# Patient Record
Sex: MALE | Race: WHITE | NOT HISPANIC OR LATINO | Employment: OTHER | URBAN - NONMETROPOLITAN AREA
[De-identification: names, ages, dates, MRNs, and addresses within clinical notes are randomized per-mention and may not be internally consistent; named-entity substitution may affect disease eponyms.]

---

## 2017-01-04 ENCOUNTER — HOSPITAL ENCOUNTER (EMERGENCY)
Facility: HOSPITAL | Age: 67
Discharge: ANOTHER HOSPITAL | End: 2017-01-05
Attending: INTERNAL MEDICINE | Admitting: INTERNAL MEDICINE
Payer: COMMERCIAL

## 2017-01-04 VITALS
OXYGEN SATURATION: 95 % | DIASTOLIC BLOOD PRESSURE: 87 MMHG | BODY MASS INDEX: 36.57 KG/M2 | RESPIRATION RATE: 16 BRPM | SYSTOLIC BLOOD PRESSURE: 166 MMHG | WEIGHT: 254.85 LBS | HEART RATE: 77 BPM | TEMPERATURE: 97.9 F

## 2017-01-04 DIAGNOSIS — G45.8 OTHER SPECIFIED TRANSIENT CEREBRAL ISCHEMIAS: ICD-10-CM

## 2017-01-04 LAB
ALBUMIN SERPL-MCNC: 3.4 G/DL (ref 3.4–5)
ALBUMIN UR-MCNC: NEGATIVE MG/DL
ALP SERPL-CCNC: 160 U/L (ref 40–150)
ALT SERPL W P-5'-P-CCNC: 25 U/L (ref 0–70)
ANION GAP SERPL CALCULATED.3IONS-SCNC: 10 MMOL/L (ref 3–14)
APPEARANCE UR: CLEAR
AST SERPL W P-5'-P-CCNC: 14 U/L (ref 0–45)
BASOPHILS # BLD AUTO: 0 10E9/L (ref 0–0.2)
BASOPHILS NFR BLD AUTO: 0.4 %
BILIRUB SERPL-MCNC: 0.5 MG/DL (ref 0.2–1.3)
BILIRUB UR QL STRIP: NEGATIVE
BUN SERPL-MCNC: 19 MG/DL (ref 7–30)
CALCIUM SERPL-MCNC: 9 MG/DL (ref 8.5–10.1)
CHLORIDE SERPL-SCNC: 109 MMOL/L (ref 94–109)
CK SERPL-CCNC: 159 U/L (ref 30–300)
CO2 SERPL-SCNC: 23 MMOL/L (ref 20–32)
COLOR UR AUTO: ABNORMAL
CREAT SERPL-MCNC: 0.75 MG/DL (ref 0.66–1.25)
CRP SERPL-MCNC: 5.5 MG/L (ref 0–8)
DIFFERENTIAL METHOD BLD: ABNORMAL
EOSINOPHIL # BLD AUTO: 0.2 10E9/L (ref 0–0.7)
EOSINOPHIL NFR BLD AUTO: 2.2 %
ERYTHROCYTE [DISTWIDTH] IN BLOOD BY AUTOMATED COUNT: 13.1 % (ref 10–15)
ETHANOL SERPL-MCNC: <0.01 G/DL
GFR SERPL CREATININE-BSD FRML MDRD: ABNORMAL ML/MIN/1.7M2
GLUCOSE BLDC GLUCOMTR-MCNC: 110 MG/DL (ref 70–99)
GLUCOSE SERPL-MCNC: 128 MG/DL (ref 70–99)
GLUCOSE UR STRIP-MCNC: NEGATIVE MG/DL
HCT VFR BLD AUTO: 37.6 % (ref 40–53)
HGB BLD-MCNC: 13.2 G/DL (ref 13.3–17.7)
HGB UR QL STRIP: NEGATIVE
IMM GRANULOCYTES # BLD: 0 10E9/L (ref 0–0.4)
IMM GRANULOCYTES NFR BLD: 0.3 %
INR PPP: 0.98 (ref 0.8–1.2)
KETONES UR STRIP-MCNC: NEGATIVE MG/DL
LACTATE SERPL-SCNC: 1.1 MMOL/L (ref 0.4–2)
LEUKOCYTE ESTERASE UR QL STRIP: NEGATIVE
LYMPHOCYTES # BLD AUTO: 2.1 10E9/L (ref 0.8–5.3)
LYMPHOCYTES NFR BLD AUTO: 28.8 %
MCH RBC QN AUTO: 30.8 PG (ref 26.5–33)
MCHC RBC AUTO-ENTMCNC: 35.1 G/DL (ref 31.5–36.5)
MCV RBC AUTO: 88 FL (ref 78–100)
MONOCYTES # BLD AUTO: 0.4 10E9/L (ref 0–1.3)
MONOCYTES NFR BLD AUTO: 6.1 %
MUCOUS THREADS #/AREA URNS LPF: PRESENT /LPF
NEUTROPHILS # BLD AUTO: 4.5 10E9/L (ref 1.6–8.3)
NEUTROPHILS NFR BLD AUTO: 62.2 %
NITRATE UR QL: NEGATIVE
NRBC # BLD AUTO: 0 10*3/UL
NRBC BLD AUTO-RTO: 0 /100
PH UR STRIP: 5.5 PH (ref 4.7–8)
PLATELET # BLD AUTO: 140 10E9/L (ref 150–450)
POTASSIUM SERPL-SCNC: 3.7 MMOL/L (ref 3.4–5.3)
PROT SERPL-MCNC: 7.5 G/DL (ref 6.8–8.8)
RBC # BLD AUTO: 4.28 10E12/L (ref 4.4–5.9)
RBC #/AREA URNS AUTO: 0 /HPF (ref 0–2)
SODIUM SERPL-SCNC: 142 MMOL/L (ref 133–144)
SP GR UR STRIP: 1.01 (ref 1–1.03)
URN SPEC COLLECT METH UR: ABNORMAL
UROBILINOGEN UR STRIP-MCNC: NORMAL MG/DL (ref 0–2)
WBC # BLD AUTO: 7.3 10E9/L (ref 4–11)
WBC #/AREA URNS AUTO: <1 /HPF (ref 0–2)

## 2017-01-04 PROCEDURE — 70450 CT HEAD/BRAIN W/O DYE: CPT | Mod: TC

## 2017-01-04 PROCEDURE — 85610 PROTHROMBIN TIME: CPT | Performed by: INTERNAL MEDICINE

## 2017-01-04 PROCEDURE — 93005 ELECTROCARDIOGRAM TRACING: CPT

## 2017-01-04 PROCEDURE — 82550 ASSAY OF CK (CPK): CPT | Performed by: INTERNAL MEDICINE

## 2017-01-04 PROCEDURE — 71010 ZZHC CHEST ONE VIEW: CPT | Mod: TC

## 2017-01-04 PROCEDURE — 81001 URINALYSIS AUTO W/SCOPE: CPT | Mod: 59 | Performed by: INTERNAL MEDICINE

## 2017-01-04 PROCEDURE — 00000146 ZZHCL STATISTIC GLUCOSE BY METER IP

## 2017-01-04 PROCEDURE — 99291 CRITICAL CARE FIRST HOUR: CPT | Mod: 25

## 2017-01-04 PROCEDURE — 36415 COLL VENOUS BLD VENIPUNCTURE: CPT | Performed by: INTERNAL MEDICINE

## 2017-01-04 PROCEDURE — 40000275 ZZH STATISTIC RCP TIME EA 10 MIN

## 2017-01-04 PROCEDURE — 80053 COMPREHEN METABOLIC PANEL: CPT | Performed by: INTERNAL MEDICINE

## 2017-01-04 PROCEDURE — 86140 C-REACTIVE PROTEIN: CPT | Performed by: INTERNAL MEDICINE

## 2017-01-04 PROCEDURE — 93010 ELECTROCARDIOGRAM REPORT: CPT | Performed by: INTERNAL MEDICINE

## 2017-01-04 PROCEDURE — 85025 COMPLETE CBC W/AUTO DIFF WBC: CPT | Performed by: INTERNAL MEDICINE

## 2017-01-04 PROCEDURE — 99285 EMERGENCY DEPT VISIT HI MDM: CPT | Performed by: INTERNAL MEDICINE

## 2017-01-04 PROCEDURE — 83605 ASSAY OF LACTIC ACID: CPT | Performed by: INTERNAL MEDICINE

## 2017-01-04 PROCEDURE — 80320 DRUG SCREEN QUANTALCOHOLS: CPT | Performed by: INTERNAL MEDICINE

## 2017-01-04 RX ORDER — CETIRIZINE HYDROCHLORIDE 10 MG/1
10 TABLET ORAL DAILY PRN
COMMUNITY

## 2017-01-05 ASSESSMENT — ENCOUNTER SYMPTOMS
LIGHT-HEADEDNESS: 0
APPETITE CHANGE: 0
ACTIVITY CHANGE: 0
SHORTNESS OF BREATH: 0
BLOOD IN STOOL: 0
MYALGIAS: 0
NEUROLOGIC COMPLAINT: 1
SLEEP DISTURBANCE: 0
FLANK PAIN: 0
ABDOMINAL PAIN: 0
ANAL BLEEDING: 0
NUMBNESS: 0
FATIGUE: 0
FREQUENCY: 0
WEAKNESS: 0
FEVER: 0
PALPITATIONS: 0
COLOR CHANGE: 0
VOICE CHANGE: 0
WHEEZING: 0
VOMITING: 0
DIZZINESS: 0
CHILLS: 0
COUGH: 0
HEADACHES: 0
UNEXPECTED WEIGHT CHANGE: 0
ABDOMINAL DISTENTION: 0
NAUSEA: 0
DIAPHORESIS: 0
CONFUSION: 0
BACK PAIN: 0
NECK PAIN: 0
CHEST TIGHTNESS: 0
DYSURIA: 0

## 2017-01-05 NOTE — ED NOTES
Called rena in [place] and spoke with charge nurse on 8E, she will have Cheries RN call back for nurse to nurse report.

## 2017-01-05 NOTE — ED NOTES
"Pt to ED via [facility]. About 50 minutes PTA pt had right sided weakness and slurred speech at home per wife. Pt was unable to hold water bottle per wife. When pt arrived to ED pt had slurred speech with right sided numbness and mild weakness. Upon arrival to ED pt states \"I'm starting to feel worse again\" and when asked name pt was hesitant and then was able to answer but speech was slurred. Wife states that speech is altered. Code stroke called.   "

## 2017-01-05 NOTE — ED PROVIDER NOTES
History     Chief Complaint   Patient presents with     Slurred Speech     slurred speech at home, wife feels speech is altered     Patient is a 66 year old male presenting with neurologic complaint. The history is provided by the patient.   Neurologic Problem  This is a new problem. The current episode started less than 1 hour ago. The problem has been resolved. Pertinent negatives include no chest pain, no abdominal pain, no headaches and no shortness of breath. Nothing aggravates the symptoms.     Joon Hernandez Sr is a 66 year old male who came for slurred speech, R sided weakness, facial drop that started 8:45 pm. In ER symptoms already resolved. Pt was able to walk without problem, smiling, no arm drift.   I was called by family member 2 times that slurred speech came back, but each time I entered the room symptoms was resolved and pt speaking normally, no neurologic deficit was noticed.     I have reviewed the Medications, Allergies, Past Medical and Surgical History, and Social History in the Epic system.    Review of Systems   Constitutional: Negative for fever, chills, diaphoresis, activity change, appetite change, fatigue and unexpected weight change.   HENT: Negative for voice change.    Eyes: Negative for visual disturbance.   Respiratory: Negative for cough, chest tightness, shortness of breath and wheezing.    Cardiovascular: Negative for chest pain, palpitations and leg swelling.   Gastrointestinal: Negative for nausea, vomiting, abdominal pain, blood in stool, abdominal distention and anal bleeding.   Genitourinary: Negative for dysuria, frequency, flank pain and decreased urine volume.   Musculoskeletal: Negative for myalgias, back pain, gait problem and neck pain.   Skin: Negative for color change, pallor and rash.   Neurological: Negative for dizziness, syncope, weakness, light-headedness, numbness and headaches.   Psychiatric/Behavioral: Negative for suicidal ideas, confusion and sleep  disturbance.       Physical Exam   Pulse: 77  Temp: 97.9  F (36.6  C)  Resp: 16  SpO2: 98 %  Physical Exam   Constitutional: He is oriented to person, place, and time. He appears well-developed and well-nourished.   HENT:   Head: Normocephalic and atraumatic.   Mouth/Throat: No oropharyngeal exudate.   Eyes: Conjunctivae are normal. Pupils are equal, round, and reactive to light.   Neck: Normal range of motion. Neck supple. No JVD present. No tracheal deviation present. No thyromegaly present.   Cardiovascular: Normal rate, regular rhythm, normal heart sounds and intact distal pulses.  Exam reveals no gallop and no friction rub.    No murmur heard.  Pulmonary/Chest: Effort normal and breath sounds normal. No stridor. No respiratory distress. He has no wheezes. He has no rales. He exhibits no tenderness.   Abdominal: Soft. Bowel sounds are normal. He exhibits no distension and no mass. There is no tenderness. There is no rebound and no guarding.   Musculoskeletal: Normal range of motion. He exhibits no edema or tenderness.   Lymphadenopathy:     He has no cervical adenopathy.   Neurological: He is alert and oriented to person, place, and time. He displays no atrophy and no tremor. No cranial nerve deficit or sensory deficit. He exhibits normal muscle tone. He displays a negative Romberg sign. He displays no seizure activity. Coordination and gait normal. GCS eye subscore is 4. GCS verbal subscore is 5. GCS motor subscore is 6.   Reflex Scores:       Patellar reflexes are 2+ on the right side and 2+ on the left side.       Achilles reflexes are 2+ on the right side and 2+ on the left side.  Skin: Skin is warm and dry. No rash noted. No erythema. No pallor.   Psychiatric: His behavior is normal.   Nursing note and vitals reviewed.      ED Course   Procedures               Labs Ordered and Resulted from Time of ED Arrival Up to the Time of Departure from the ED   ROUTINE UA WITH MICROSCOPIC REFLEX TO CULTURE - Abnormal;  Notable for the following:     Mucous Urine Present (*)     All other components within normal limits   CBC WITH PLATELETS DIFFERENTIAL - Abnormal; Notable for the following:     RBC Count 4.28 (*)     Hemoglobin 13.2 (*)     Hematocrit 37.6 (*)     Platelet Count 140 (*)     All other components within normal limits   COMPREHENSIVE METABOLIC PANEL - Abnormal; Notable for the following:     Glucose 128 (*)     Alkaline Phosphatase 160 (*)     All other components within normal limits   GLUCOSE BY METER - Abnormal; Notable for the following:     Glucose 110 (*)     All other components within normal limits   ALCOHOL ETHYL   CK TOTAL   CRP INFLAMMATION   INR   LACTIC ACID       Assessments & Plan (with Medical Decision Making)   National Institutes of Health Stroke Scale  Exam Interval: Baseline   Score    Level of consciousness: (0)   Alert, keenly responsive    LOC questions: (0)   Answers both questions correctly    LOC commands: (0)   Performs both tasks correctly    Best gaze: (0)   Normal    Visual: (0)   No visual loss    Facial palsy: (0)   Normal symmetrical movements    Motor arm (left): (0)   No drift    Motor arm (right): (0)   No drift    Motor leg (left): (0)   No drift    Motor leg (right): (0)   No drift    Limb ataxia: (0)   Absent    Sensory: (0)   Normal- no sensory loss    Best language: (0)   Normal- no aphasia    Dysarthria: (0)   Normal    Extinction and inattention: (0)   No abnormality        Total Score:  0     R sided weakness+ facial droop+ slurred speech  Symptoms completely resolved  I spoke vic Mendez neurologist, he recommended admission and watch for reccurrence of symptoms, possible capsular instability syndrome vs TIA, he recommend if symptoms repeated he wound be a candidate for TPA  I spoke to Dr Wells, he advised to transfer to a center with stroke specialist as symptoms are atypical and pt benefit from direct observation of symptoms by a stroke specialist   Family agreed  with transfer,   Spoke to Dr Castellon, accepted for admission  Pt was AAox3 , no neurolgic deficit at the time transfer    I have reviewed the nursing notes.    I have reviewed the findings, diagnosis, plan and need for follow up with the patient.    Discharge Medication List as of 1/5/2017 12:58 AM          Final diagnoses:   Other specified transient cerebral ischemias       1/4/2017   [facility]      Alex Saeed MD  01/05/17 0130

## 2017-01-05 NOTE — ED NOTES
Pt continues to be negative for FAST but has some facial numbness and slurred speech that comes on and resolves suddenly.

## 2017-01-05 NOTE — ED NOTES
Dr. Wells in to see pt. States speech is slurred again. Family confirmed that speech is not normal.

## 2017-01-07 ENCOUNTER — TRANSFERRED RECORDS (OUTPATIENT)
Dept: HEALTH INFORMATION MANAGEMENT | Facility: HOSPITAL | Age: 67
End: 2017-01-07

## 2017-01-12 ENCOUNTER — TRANSFERRED RECORDS (OUTPATIENT)
Dept: HEALTH INFORMATION MANAGEMENT | Facility: HOSPITAL | Age: 67
End: 2017-01-12

## 2017-01-13 DIAGNOSIS — E11.9 CONTROLLED TYPE 2 DIABETES MELLITUS WITHOUT COMPLICATION, WITHOUT LONG-TERM CURRENT USE OF INSULIN (H): Primary | ICD-10-CM

## 2017-01-19 ENCOUNTER — OFFICE VISIT (OUTPATIENT)
Dept: SLEEP MEDICINE | Facility: HOSPITAL | Age: 67
End: 2017-01-19
Attending: INTERNAL MEDICINE
Payer: COMMERCIAL

## 2017-01-19 VITALS
WEIGHT: 250 LBS | HEART RATE: 88 BPM | OXYGEN SATURATION: 94 % | SYSTOLIC BLOOD PRESSURE: 122 MMHG | DIASTOLIC BLOOD PRESSURE: 58 MMHG | HEIGHT: 70 IN | BODY MASS INDEX: 35.79 KG/M2

## 2017-01-19 DIAGNOSIS — G47.33 OBSTRUCTIVE SLEEP APNEA: Primary | ICD-10-CM

## 2017-01-19 PROCEDURE — 99212 OFFICE O/P EST SF 10 MIN: CPT | Performed by: INTERNAL MEDICINE

## 2017-01-19 PROCEDURE — 99212 OFFICE O/P EST SF 10 MIN: CPT

## 2017-01-19 NOTE — PROGRESS NOTES
"Pt with study in 2013 that showed an AHI of 13 and PLMs, he tried cpap and his stuffy nose precluded its use. Recent small stroke. Still pretty sleepy.  /58 mmHg  Pulse 88  Ht 5' 9.5\" (1.765 m)  Wt 250 lb (113.399 kg)  BMI 36.40 kg/m2  SpO2 94%       A/ LULY and UARS + PLMs, retry cpap, prn doxylamine.  "

## 2017-01-19 NOTE — PROGRESS NOTES
Roomed patient, verified ID by name and .  Took vitals and brief history.  Reviewed medications, allergies and smoking history.  Updated order for APAP faxed to [facility].

## 2017-01-30 ENCOUNTER — HOSPITAL ENCOUNTER (OUTPATIENT)
Dept: EDUCATION SERVICES | Facility: HOSPITAL | Age: 67
Discharge: HOME | End: 2017-01-30
Attending: FAMILY MEDICINE | Admitting: FAMILY MEDICINE
Payer: COMMERCIAL

## 2017-01-30 VITALS
OXYGEN SATURATION: 94 % | HEART RATE: 89 BPM | BODY MASS INDEX: 35.09 KG/M2 | HEIGHT: 70 IN | SYSTOLIC BLOOD PRESSURE: 118 MMHG | WEIGHT: 245.1 LBS | DIASTOLIC BLOOD PRESSURE: 72 MMHG

## 2017-01-30 DIAGNOSIS — E11.9 TYPE 2 DIABETES MELLITUS WITHOUT COMPLICATION, WITHOUT LONG-TERM CURRENT USE OF INSULIN (H): ICD-10-CM

## 2017-01-30 DIAGNOSIS — Z71.89 ACP (ADVANCE CARE PLANNING): Primary | Chronic | ICD-10-CM

## 2017-01-30 PROCEDURE — G0108 DIAB MANAGE TRN  PER INDIV: HCPCS | Performed by: DIETITIAN, REGISTERED

## 2017-01-30 ASSESSMENT — ANXIETY QUESTIONNAIRES
7. FEELING AFRAID AS IF SOMETHING AWFUL MIGHT HAPPEN: NOT AT ALL
GAD7 TOTAL SCORE: 0
1. FEELING NERVOUS, ANXIOUS, OR ON EDGE: NOT AT ALL
5. BEING SO RESTLESS THAT IT IS HARD TO SIT STILL: NOT AT ALL
3. WORRYING TOO MUCH ABOUT DIFFERENT THINGS: NOT AT ALL
2. NOT BEING ABLE TO STOP OR CONTROL WORRYING: NOT AT ALL
6. BECOMING EASILY ANNOYED OR IRRITABLE: NOT AT ALL

## 2017-01-30 ASSESSMENT — PAIN SCALES - GENERAL: PAINLEVEL: NO PAIN (0)

## 2017-01-30 ASSESSMENT — PATIENT HEALTH QUESTIONNAIRE - PHQ9: 5. POOR APPETITE OR OVEREATING: NOT AT ALL

## 2017-01-30 NOTE — PROGRESS NOTES
"Pt here for Session 1.  He is here with his wife who is supportive.  Pt reports he has been \"borderline\" x 25 years.  Did have a meter at one time but did not use it as he could not afford strips for it.  Pt recently had a CVA but has not residual side effects.      BG readings as follows:   2 hours after breakfast-121, 129  2 hours after [id number]  2 hours after [id number]  Overall average is 124.    Most recent A1c was 6.7% (1-5-17).      Blood pressure 118/72, pulse 89, height 1.765 m (5' 9.5\"), weight 111.177 kg (245 lb 1.6 oz), SpO2 94 %.  Weight down 12# when recently hospitalized for CVA.     Current diabetes medications: Metformin  mg with breakfast.      Readiness to learn: very willing.     Barriers to learning: none.     Pt previously only ate one meal per day and snacked throughout the day.  He has since his CVA started to eat 3 meals/day.  Appetite is large.  Ate many chips but has stopped.  Reduced intake of red meat.  Verbal discussion notes diet high in sodium - we discussed this r/t blood pressure issues/CVA.  Likes - salami, tomato juice, sausage.      Topics covered: diabetes pathophysiology, symptoms, diagnosis, treatments, medication actions, BG self-monitoring, HgbA1c, targets (blood sugar/A1c), sharps disposal, complications and risk factors,  food planning/carbohydrate counting meal plan, portion control, label reading and benefits of physical activity.    Pt actively participate and demonstrated adequate understanding of topics discussed.    Pt has One Touch Verio meter. Supplies are covered.  No issues with testing.      Plan: Follow carbohydrate counting meal plan provided.  Keep food logs.  Try to begin some routine exercise - goal is 30 minutes daily.  Test glucose 1x/day - alternate times.     Follow up: Session 2.     Total time spent with patient: 75 minutes.      Annabella Avila, JENNY, CDE    "

## 2017-01-30 NOTE — IP AVS SNAPSHOT
MRN:[id number]                      After Visit Summary   1/30/2017    Joon Hernandez     MRN: [id number]           Thank you!     Thank you for choosing [facility] for your care. Our goal is always to provide you with excellent care. Hearing back from our patients is one way we can continue to improve our services. Please take a few minutes to complete the written survey that you may receive in the mail after you visit with us. Thank you!        Patient Information     Date Of Birth          1950        About your hospital stay     You were admitted on:  January 30, 2017 You last received care in the:  [facility]    You were discharged on:  January 30, 2017       Who to Call     For medical emergencies, please call 911.  For non-urgent questions about your medical care, please call your primary care provider or clinic, [phone]          Attending Provider     Provider    Susan Osullivan MD       Primary Care Provider Office Phone # Fax #    Susan Osullivan [phone]       [facility] [address] [address]        Your next 10 appointments already scheduled     Apr 13, 2017  9:30 AM   Return Visit with Gerry Goff MD   [facility] ([facility] )    [address] [address] [phone]              Further instructions from your care team       -Follow carbohydrate counting meal plan - 60 grams/meal, 15-30 grams/snack.  -Be as active as you can - exercise goal is 30 minutes most days of the week.    -Test glucose 1x/day- alternate times - fasting, before supper, 2 hours after supper.   -Target levels are fasting and before supper , 2 hours after supper less than 160/180.   -Bring food log, book, folder and meter to follow up sessions.   -Call with any questions - JENNY Arvizu, [phone]    Pending Results     No orders found from 1/29/2017 to 1/31/2017.            Admission  "Information        Provider Department Dept Phone    1/30/2017 Susan Osullivan MD [facility] [phone]      Your Vitals Were     Blood Pressure Pulse Height Weight BMI (Body Mass Index) Pulse Oximetry    118/72 mmHg 89 1.765 m (5' 9.5\") 111.177 kg (245 lb 1.6 oz) 35.69 kg/m2 94%      MyChart Information     [facility] gives you secure access to your electronic health record. If you see a primary care provider, you can also send messages to your care team and make appointments. If you have questions, please call your primary care clinic.  If you do not have a primary care provider, please call [phone] and they will assist you.        Care EveryWhere ID     This is your Care EveryWhere ID. This could be used by other organizations to access your [place] [facility] medical records  [id number]           Review of your medicines      UNREVIEWED medicines. Ask your doctor about these medicines        Dose / Directions    allopurinol 100 MG tablet   Commonly known as:  ZYLOPRIM        Dose:  100 mg   Take 100 mg by mouth daily.   Refills:  0       ASPIR-81 PO        Take  by mouth.   Refills:  0       cetirizine 10 MG tablet   Commonly known as:  zyrTEC        Dose:  10 mg   Take 10 mg by mouth daily   Refills:  0       FISH OIL PO        1000 mg once a day   Refills:  0       GLUCOSAMINE CHONDROITIN JOINT PO        Dose:  1 tablet   Take 1 tablet by mouth   Refills:  0       indomethacin 25 MG capsule   Commonly known as:  INDOCIN        Dose:  25 mg   Take 25 mg by mouth 2 times daily as needed   Refills:  0       LIPITOR PO        Dose:  80 mg   Take 80 mg by mouth daily   Refills:  0       lisinopril 10 MG tablet   Commonly known as:  PRINIVIL/ZESTRIL        Dose:  40 mg   Take 40 mg by mouth daily   Refills:  0       METFORMIN HCL ER PO        500 mg once a day   Refills:  0       MULTIVITAMIN PO        Dose:  1 tablet   Take 1 tablet by mouth daily   Refills:  0       PLAVIX PO        Refills:  0       VITAMIN " D (CHOLECALCIFEROL) PO        Dose:  5000 Units   Take 5,000 Units by mouth daily   Refills:  0                Protect others around you: Learn how to safely use, store and throw away your medicines at www.disposemymeds.org.             Medication List: This is a list of all your medications and when to take them. Check marks below indicate your daily home schedule. Keep this list as a reference.      Medications           Morning Afternoon Evening Bedtime As Needed    allopurinol 100 MG tablet   Commonly known as:  ZYLOPRIM   Take 100 mg by mouth daily.                                ASPIR-81 PO   Take  by mouth.                                cetirizine 10 MG tablet   Commonly known as:  zyrTEC   Take 10 mg by mouth daily                                FISH OIL PO   1000 mg once a day                                GLUCOSAMINE CHONDROITIN JOINT PO   Take 1 tablet by mouth                                indomethacin 25 MG capsule   Commonly known as:  INDOCIN   Take 25 mg by mouth 2 times daily as needed                                LIPITOR PO   Take 80 mg by mouth daily                                lisinopril 10 MG tablet   Commonly known as:  PRINIVIL/ZESTRIL   Take 40 mg by mouth daily                                METFORMIN HCL ER PO   500 mg once a day                                MULTIVITAMIN PO   Take 1 tablet by mouth daily                                PLAVIX PO                                VITAMIN D (CHOLECALCIFEROL) PO   Take 5,000 Units by mouth daily

## 2017-01-30 NOTE — IP AVS SNAPSHOT
[facility]    [address] [place] [address]    Phone:  [phone]    Fax:  [phone]                                       After Visit Summary   1/30/2017    Joon Hernandez Sr    MRN: [id number]           After Visit Summary Signature Page     I have received my discharge instructions, and my questions have been answered. I have discussed any challenges I see with this plan with the nurse or doctor.    ..........................................................................................................................................  Patient/Patient Representative Signature      ..........................................................................................................................................  Patient Representative Print Name and Relationship to Patient    ..................................................               ................................................  Date                                            Time    ..........................................................................................................................................  Reviewed by Signature/Title    ...................................................              ..............................................  Date                                                            Time

## 2017-01-30 NOTE — DISCHARGE INSTRUCTIONS
-Follow carbohydrate counting meal plan - 60 grams/meal, 15-30 grams/snack.  -Be as active as you can - exercise goal is 30 minutes most days of the week.    -Test glucose 1x/day- alternate times - fasting, before supper, 2 hours after supper.   -Target levels are fasting and before supper , 2 hours after supper less than 160/180.   -Bring food log, book, folder and meter to follow up sessions.   -Call with any questions - JENNY Arvizu, -[phone]

## 2017-01-31 ASSESSMENT — PATIENT HEALTH QUESTIONNAIRE - PHQ9: SUM OF ALL RESPONSES TO PHQ QUESTIONS 1-9: 8

## 2017-01-31 ASSESSMENT — ANXIETY QUESTIONNAIRES: GAD7 TOTAL SCORE: 0

## 2017-02-27 ENCOUNTER — HOSPITAL ENCOUNTER (OUTPATIENT)
Dept: EDUCATION SERVICES | Facility: HOSPITAL | Age: 67
Discharge: HOME | End: 2017-02-27
Attending: NURSE PRACTITIONER | Admitting: FAMILY MEDICINE
Payer: COMMERCIAL

## 2017-02-27 VITALS
HEART RATE: 71 BPM | OXYGEN SATURATION: 95 % | BODY MASS INDEX: 34.84 KG/M2 | DIASTOLIC BLOOD PRESSURE: 60 MMHG | SYSTOLIC BLOOD PRESSURE: 122 MMHG | WEIGHT: 243.4 LBS | HEIGHT: 70 IN

## 2017-02-27 DIAGNOSIS — E11.9 TYPE 2 DIABETES MELLITUS WITHOUT COMPLICATION, WITHOUT LONG-TERM CURRENT USE OF INSULIN (H): Primary | ICD-10-CM

## 2017-02-27 PROCEDURE — G0108 DIAB MANAGE TRN  PER INDIV: HCPCS | Performed by: DIETITIAN, REGISTERED

## 2017-02-27 ASSESSMENT — PAIN SCALES - GENERAL: PAINLEVEL: NO PAIN (0)

## 2017-02-27 NOTE — PROGRESS NOTES
"Pt here for session 2.     BG readings as follows:   Fasting-129, 132, 124  Before lunch-123  Before supper-109, 136  After [id number]  Overall average is 123    Blood pressure 122/60, pulse 71, height 1.765 m (5' 9.5\"), weight 110.4 kg (243 lb 6.4 oz), SpO2 95 %.  Weight is down 1.7# from last visit.      Current diabetes medications: Metformin  mg with breakfast    Readiness to learn: willing.  Wife very supportive.     Barriers to learning: none.    Pt did bring some food logs today.  He is doing a good job of eating more frequently throughout the day and limiting carbohydrates.  He has reduced portions of snacks and is making healthier choices.  No regular exercise at this time.  Encouraged.    Pt actively participated and demonstrated adequate understanding of topics discussed.     Topics covered: evaluating BG self-test results & improving self-testing skills, meter maintenance & , causes & treatment for high & low blood sugar, sick day management skills, diabetes success plan - behavior change goals set, carbohydrate counting review, strategies for food selection when eating away from home and alcohol and diabetes.     Pt is doing well with using glucose monitor and had no concerns today.      Goals: not addressed today.      Plan: Continue meal planning efforts.   Test 1x/day - alternate times.  Increase exercise to goal of 30 minutes most days of the week.      Follow up: Session 3.      Total time spent with patient: 45 minutes.      JENNY Arvizu, CDE    "

## 2017-02-27 NOTE — DISCHARGE INSTRUCTIONS
-Keep making efforts to limit carbohydrates in your diet - good job!  -Be as active as you can - exercise goal is 30 minutes most days of the week.   -Test 1x/day - alternate times.    -Target levels are fasting and before supper , 2 hours after supper less than 180.    -Weight today was 243.4#.   -Follow up in 1 month.    -Call with any concerns - JENNY Arvizu, -[phone]

## 2017-02-27 NOTE — NURSING NOTE
"Chief Complaint   Patient presents with     Diabetes     session 2- no current concerns        Initial /60 (BP Location: Right arm, Patient Position: Chair, Cuff Size: Adult Large)  Pulse 71  Ht 1.765 m (5' 9.5\")  Wt 110.4 kg (243 lb 6.4 oz)  SpO2 95%  BMI 35.43 kg/m2 Estimated body mass index is 35.43 kg/(m^2) as calculated from the following:    Height as of this encounter: 1.765 m (5' 9.5\").    Weight as of this encounter: 110.4 kg (243 lb 6.4 oz).  Medication Reconciliation: complete     Kim Gómez LPN      "

## 2017-02-27 NOTE — IP AVS SNAPSHOT
[facility]    [address] [place] [address]    Phone:  [phone]    Fax:  [phone]                                       After Visit Summary   2/27/2017    Joon Hernandez Sr    MRN: [id number]           After Visit Summary Signature Page     I have received my discharge instructions, and my questions have been answered. I have discussed any challenges I see with this plan with the nurse or doctor.    ..........................................................................................................................................  Patient/Patient Representative Signature      ..........................................................................................................................................  Patient Representative Print Name and Relationship to Patient    ..................................................               ................................................  Date                                            Time    ..........................................................................................................................................  Reviewed by Signature/Title    ...................................................              ..............................................  Date                                                            Time

## 2017-02-27 NOTE — IP AVS SNAPSHOT
MRN:[id number]                      After Visit Summary   2/27/2017    Joon Hernandez     MRN: [id number]           Thank you!     Thank you for choosing [facility] for your care. Our goal is always to provide you with excellent care. Hearing back from our patients is one way we can continue to improve our services. Please take a few minutes to complete the written survey that you may receive in the mail after you visit with us. Thank you!        Patient Information     Date Of Birth          1950        About your hospital stay     You were admitted on:  February 27, 2017 You last received care in the:  [facility]    You were discharged on:  February 27, 2017       Who to Call     For medical emergencies, please call 911.  For non-urgent questions about your medical care, please call your primary care provider or clinic, [phone]          Attending Provider     Provider Specialty    Dipika Valle NP --       Primary Care Provider Office Phone # Fax #    Susan Osullivan [phone]       [facility] [address] [address]        Your next 10 appointments already scheduled     Apr 13, 2017  9:30 AM CDT   Return Visit with Gerry Goff MD   [facility] ([facility] )    [address] [address] [phone]              Further instructions from your care team       -Keep making efforts to limit carbohydrates in your diet - good job!  -Be as active as you can - exercise goal is 30 minutes most days of the week.   -Test 1x/day - alternate times.    -Target levels are fasting and before supper , 2 hours after supper less than 180.    -Weight today was 243.4#.   -Follow up in 1 month.    -Call with any concerns - JENNY Arvizu, [phone]    Pending Results     No orders found from 2/25/2017 to 2/28/2017.            Admission Information     Date & Time Provider Department Dept. Phone     "2/27/2017 Dipika Valle NP [facility] [phone]      Your Vitals Were     Blood Pressure Pulse Height Weight Pulse Oximetry BMI (Body Mass Index)    122/60 (BP Location: Right arm, Patient Position: Chair, Cuff Size: Adult Large) 71 1.765 m (5' 9.5\") 110.4 kg (243 lb 6.4 oz) 95% 35.43 kg/m2      [employer] Information     [employer] gives you secure access to your electronic health record. If you see a primary care provider, you can also send messages to your care team and make appointments. If you have questions, please call your primary care clinic.  If you do not have a primary care provider, please call [phone] and they will assist you.        Care EveryWhere ID     This is your Care EveryWhere ID. This could be used by other organizations to access your [facility] medical records  [id number]           Review of your medicines      UNREVIEWED medicines. Ask your doctor about these medicines        Dose / Directions    allopurinol 100 MG tablet   Commonly known as:  ZYLOPRIM        Dose:  100 mg   Take 100 mg by mouth daily.   Refills:  0       ASPIR-81 PO        Take  by mouth.   Refills:  0       cetirizine 10 MG tablet   Commonly known as:  zyrTEC        Dose:  10 mg   Take 10 mg by mouth daily   Refills:  0       FISH OIL PO        1000 mg once a day   Refills:  0       GLUCOSAMINE CHONDROITIN JOINT PO        Dose:  1 tablet   Take 1 tablet by mouth   Refills:  0       indomethacin 25 MG capsule   Commonly known as:  INDOCIN        Dose:  25 mg   Take 25 mg by mouth 2 times daily as needed   Refills:  0       LIPITOR PO        Dose:  80 mg   Take 80 mg by mouth daily   Refills:  0       lisinopril 10 MG tablet   Commonly known as:  PRINIVIL/ZESTRIL        Dose:  40 mg   Take 40 mg by mouth daily   Refills:  0       METFORMIN HCL ER PO        500 mg once a day   Refills:  0       MULTIVITAMIN PO        Dose:  1 tablet   Take 1 tablet by mouth daily   Refills:  0       PLAVIX PO        Refills:  " 0       VITAMIN D (CHOLECALCIFEROL) PO        Dose:  5000 Units   Take 5,000 Units by mouth daily   Refills:  0                Protect others around you: Learn how to safely use, store and throw away your medicines at www.disposemymeds.org.             Medication List: This is a list of all your medications and when to take them. Check marks below indicate your daily home schedule. Keep this list as a reference.      Medications           Morning Afternoon Evening Bedtime As Needed    allopurinol 100 MG tablet   Commonly known as:  ZYLOPRIM   Take 100 mg by mouth daily.                                ASPIR-81 PO   Take  by mouth.                                cetirizine 10 MG tablet   Commonly known as:  zyrTEC   Take 10 mg by mouth daily                                FISH OIL PO   1000 mg once a day                                GLUCOSAMINE CHONDROITIN JOINT PO   Take 1 tablet by mouth                                indomethacin 25 MG capsule   Commonly known as:  INDOCIN   Take 25 mg by mouth 2 times daily as needed                                LIPITOR PO   Take 80 mg by mouth daily                                lisinopril 10 MG tablet   Commonly known as:  PRINIVIL/ZESTRIL   Take 40 mg by mouth daily                                METFORMIN HCL ER PO   500 mg once a day                                MULTIVITAMIN PO   Take 1 tablet by mouth daily                                PLAVIX PO                                VITAMIN D (CHOLECALCIFEROL) PO   Take 5,000 Units by mouth daily

## 2017-03-21 ENCOUNTER — TRANSFERRED RECORDS (OUTPATIENT)
Dept: HEALTH INFORMATION MANAGEMENT | Facility: HOSPITAL | Age: 67
End: 2017-03-21

## 2017-03-27 ENCOUNTER — HOSPITAL ENCOUNTER (OUTPATIENT)
Dept: EDUCATION SERVICES | Facility: HOSPITAL | Age: 67
Discharge: HOME | End: 2017-03-27
Attending: NURSE PRACTITIONER | Admitting: FAMILY MEDICINE
Payer: COMMERCIAL

## 2017-03-27 VITALS
DIASTOLIC BLOOD PRESSURE: 70 MMHG | HEIGHT: 70 IN | BODY MASS INDEX: 34.46 KG/M2 | HEART RATE: 84 BPM | SYSTOLIC BLOOD PRESSURE: 131 MMHG | WEIGHT: 240.7 LBS | OXYGEN SATURATION: 95 %

## 2017-03-27 DIAGNOSIS — E11.9 TYPE 2 DIABETES MELLITUS WITHOUT COMPLICATION, WITHOUT LONG-TERM CURRENT USE OF INSULIN (H): Primary | ICD-10-CM

## 2017-03-27 PROCEDURE — 97803 MED NUTRITION INDIV SUBSEQ: CPT | Performed by: DIETITIAN, REGISTERED

## 2017-03-27 ASSESSMENT — PAIN SCALES - GENERAL: PAINLEVEL: NO PAIN (0)

## 2017-03-27 NOTE — DISCHARGE INSTRUCTIONS
-Keep limiting carbohydrates in your diet.  60 grams/meal, 15-30 grams/snack.   -Be as active as you can - 30 minutes most days of the week is goal.   -Test glucose 1x/day - alternate times.   -Target levels are fasting and before meals , 2 hours after meals less than 180.   -Weight today was 240.7#.    -Follow up in 1 month.    -Call with any questions-concerns - JENNY Arvizu, [phone]

## 2017-03-27 NOTE — NURSING NOTE
"Chief Complaint   Patient presents with     Diabetes     session 3       Initial /70 (BP Location: Left arm, Patient Position: Chair, Cuff Size: Adult Large)  Pulse 84  Ht 1.765 m (5' 9.5\")  Wt 109.2 kg (240 lb 11.2 oz)  SpO2 95%  BMI 35.04 kg/m2 Estimated body mass index is 35.04 kg/(m^2) as calculated from the following:    Height as of this encounter: 1.765 m (5' 9.5\").    Weight as of this encounter: 109.2 kg (240 lb 11.2 oz).  Medication Reconciliation: complete   Sydney Amaya      "

## 2017-03-27 NOTE — IP AVS SNAPSHOT
MRN:[id number]                      After Visit Summary   3/27/2017    Joon Hernandez     MRN: [id number]           Thank you!     Thank you for choosing [facility] for your care. Our goal is always to provide you with excellent care. Hearing back from our patients is one way we can continue to improve our services. Please take a few minutes to complete the written survey that you may receive in the mail after you visit with us. Thank you!        Patient Information     Date Of Birth          1950        About your hospital stay     You were admitted on:  March 27, 2017 You last received care in the:  [facility]    You were discharged on:  March 27, 2017       Who to Call     For medical emergencies, please call 911.  For non-urgent questions about your medical care, please call your primary care provider or clinic, [phone]          Attending Provider     Provider Specialty    Dipika Valle NP --       Primary Care Provider Office Phone # Fax #    Susan Osullivan [phone]       [facility] [address] [address]        Your next 10 appointments already scheduled     Apr 13, 2017  9:30 AM CDT   Return Visit with Gerry Goff MD   [facility] ([facility] )    [address] [address] [phone]              Further instructions from your care team       -Keep limiting carbohydrates in your diet.  60 grams/meal, 15-30 grams/snack.   -Be as active as you can - 30 minutes most days of the week is goal.   -Test glucose 1x/day - alternate times.   -Target levels are fasting and before meals , 2 hours after meals less than 180.   -Weight today was 240.7#.    -Follow up in 1 month.    -Call with any questions-concerns - JENNY Arvizu, [phone]    Pending Results     No orders found from 3/25/2017 to 3/28/2017.            Admission Information     Date & Time Provider Department Dept.  "Phone    3/27/2017 Dipika Valle NP [facility] [phone]      Your Vitals Were     Blood Pressure Pulse Height Weight Pulse Oximetry BMI (Body Mass Index)    131/70 (BP Location: Left arm, Patient Position: Chair, Cuff Size: Adult Large) 84 1.765 m (5' 9.5\") 109.2 kg (240 lb 11.2 oz) 95% 35.04 kg/m2      [employer] Information     [employer] gives you secure access to your electronic health record. If you see a primary care provider, you can also send messages to your care team and make appointments. If you have questions, please call your primary care clinic.  If you do not have a primary care provider, please call [phone] and they will assist you.        Care EveryWhere ID     This is your Care EveryWhere ID. This could be used by other organizations to access your [place] medical records  [id number]           Review of your medicines      UNREVIEWED medicines. Ask your doctor about these medicines        Dose / Directions    allopurinol 100 MG tablet   Commonly known as:  ZYLOPRIM        Dose:  100 mg   Take 100 mg by mouth daily.   Refills:  0       ASPIR-81 PO        Take  by mouth.   Refills:  0       cetirizine 10 MG tablet   Commonly known as:  zyrTEC        Dose:  10 mg   Take 10 mg by mouth daily   Refills:  0       FISH OIL PO        1000 mg once a day   Refills:  0       GLUCOSAMINE CHONDROITIN JOINT PO        Dose:  1 tablet   Take 1 tablet by mouth   Refills:  0       indomethacin 25 MG capsule   Commonly known as:  INDOCIN        Dose:  25 mg   Take 25 mg by mouth 2 times daily as needed   Refills:  0       LIPITOR PO        Dose:  80 mg   Take 80 mg by mouth daily   Refills:  0       lisinopril 10 MG tablet   Commonly known as:  PRINIVIL/ZESTRIL        Dose:  40 mg   Take 40 mg by mouth daily   Refills:  0       METFORMIN HCL ER PO        500 mg once a day   Refills:  0       MULTIVITAMIN PO        Dose:  1 tablet   Take 1 tablet by mouth daily   Refills:  0       PLAVIX PO        " Refills:  0       VITAMIN D (CHOLECALCIFEROL) PO        Dose:  5000 Units   Take 5,000 Units by mouth daily   Refills:  0                Protect others around you: Learn how to safely use, store and throw away your medicines at www.disposemymeds.org.             Medication List: This is a list of all your medications and when to take them. Check marks below indicate your daily home schedule. Keep this list as a reference.      Medications           Morning Afternoon Evening Bedtime As Needed    allopurinol 100 MG tablet   Commonly known as:  ZYLOPRIM   Take 100 mg by mouth daily.                                ASPIR-81 PO   Take  by mouth.                                cetirizine 10 MG tablet   Commonly known as:  zyrTEC   Take 10 mg by mouth daily                                FISH OIL PO   1000 mg once a day                                GLUCOSAMINE CHONDROITIN JOINT PO   Take 1 tablet by mouth                                indomethacin 25 MG capsule   Commonly known as:  INDOCIN   Take 25 mg by mouth 2 times daily as needed                                LIPITOR PO   Take 80 mg by mouth daily                                lisinopril 10 MG tablet   Commonly known as:  PRINIVIL/ZESTRIL   Take 40 mg by mouth daily                                METFORMIN HCL ER PO   500 mg once a day                                MULTIVITAMIN PO   Take 1 tablet by mouth daily                                PLAVIX PO                                VITAMIN D (CHOLECALCIFEROL) PO   Take 5,000 Units by mouth daily

## 2017-03-27 NOTE — IP AVS SNAPSHOT
[facility]    [address] [place] [address]    Phone:  [phone]    Fax:  [phone]                                       After Visit Summary   3/27/2017    Joon Hernandez Sr    MRN: [id number]           After Visit Summary Signature Page     I have received my discharge instructions, and my questions have been answered. I have discussed any challenges I see with this plan with the nurse or doctor.    ..........................................................................................................................................  Patient/Patient Representative Signature      ..........................................................................................................................................  Patient Representative Print Name and Relationship to Patient    ..................................................               ................................................  Date                                            Time    ..........................................................................................................................................  Reviewed by Signature/Title    ...................................................              ..............................................  Date                                                            Time

## 2017-03-27 NOTE — PROGRESS NOTES
"Pt here for session 3.  He is here again with his wife and she is supportive.     BG readings as follows:   [id number]  Before jlcku-  Post [id number]  Overall average is 126.     A1c at dx was 6.7%.  Plan is for recheck 4-10-17.      Blood pressure 131/70, pulse 84, height 1.765 m (5' 9.5\"), weight 109.2 kg (240 lb 11.2 oz), SpO2 95 %.  Weight is down 2.7# from last visit, down 4.4# from initial visit.     Current diabetes medications: Metformin  mg with breakfast.      Readiness to learn: very willing.     Barriers to learning: none.      Pt reports he continues to make efforts to limit carbohydrates in his diet.  He states that recently they have been busy and dining out more which is challenging.  Reviewed hints for healthy choices when dining out.  No routine exercise still.      Foot exam 11/2016.  Eye exam March 2017.  Lipids 1-2017:  Tg-78, HDL-38, LDL-53 with use of Lipitor, Fish Oil supplements.     Pt actively participated and demonstrated adequate understanding of topics discussed.  Topics reviewed include: BG self-test and A1c connection, evaluating records to better understand highs and lows, how diabetes and therapies will change over time, expectations of future primary care visits, prevention of complications, foot care skills, risk factors for heart disease,  importance of choosing unsaturated fats, blood pressure and lipid targets, and heart healthy guidelines.     Plan: Continue meal planning efforts.  Exercise encouraged.  Consider increase Metformin ER to 500 mg bid as some fasting levels are borderline.  Pt encouraged to discuss with provider and upcoming visit.  Test 1x/day - alternate times.      Follow up: Session 4.     Total time spent with patient: 50 minutes.      JENNY Arvizu, CDE      "

## 2017-04-13 ENCOUNTER — OFFICE VISIT (OUTPATIENT)
Dept: SLEEP MEDICINE | Facility: HOSPITAL | Age: 67
End: 2017-04-13
Attending: INTERNAL MEDICINE
Payer: COMMERCIAL

## 2017-04-13 VITALS
DIASTOLIC BLOOD PRESSURE: 62 MMHG | OXYGEN SATURATION: 99 % | RESPIRATION RATE: 12 BRPM | SYSTOLIC BLOOD PRESSURE: 112 MMHG | HEART RATE: 74 BPM

## 2017-04-13 DIAGNOSIS — G47.30 SLEEP APNEA, UNSPECIFIED TYPE: Primary | ICD-10-CM

## 2017-04-13 PROCEDURE — 99212 OFFICE O/P EST SF 10 MIN: CPT

## 2017-04-13 PROCEDURE — 99211 OFF/OP EST MAY X REQ PHY/QHP: CPT | Performed by: INTERNAL MEDICINE

## 2017-04-13 NOTE — PROGRESS NOTES
Doing well, likes the nasal pillows best. Compliance is 100 %, AHI  Less than 1, fells more awake during the day.   /62  Pulse 74  Resp 12  SpO2 99%    A/ LULY well treated.

## 2017-04-13 NOTE — MR AVS SNAPSHOT
After Visit Summary   4/13/2017    Joon Hernandez     MRN: [id number]           Patient Information     Date Of Birth          1950        Visit Information        Provider Department      4/13/2017 9:30 AM Gerry Goff MD [facility]        Today's Diagnoses     Sleep apnea, unspecified type    -  1       Follow-ups after your visit        Your next 10 appointments already scheduled     Apr 24, 2017 10:30 AM CDT   (Arrive by 10:15 AM)   Return Visit with Annabella Avila RD   [facility] ([facility] )    [address] [place] [address] [phone]              Who to contact     If you have questions or need follow up information about today's clinic visit or your schedule please contact [facility] directly at [phone].  Normal or non-critical lab and imaging results will be communicated to you by MyChart, letter or phone within 4 business days after the clinic has received the results. If you do not hear from us within 7 days, please contact the clinic through [employer] or phone. If you have a critical or abnormal lab result, we will notify you by phone as soon as possible.  Submit refill requests through [employer] or call your pharmacy and they will forward the refill request to us. Please allow 3 business days for your refill to be completed.          Additional Information About Your Visit        MyChart Information     [employer] gives you secure access to your electronic health record. If you see a primary care provider, you can also send messages to your care team and make appointments. If you have questions, please call your primary care clinic.  If you do not have a primary care provider, please call [phone] and they will assist you.        Care EveryWhere ID     This is your Care EveryWhere ID. This could be used by other organizations to access your [facility] medical records  [id number]        Your Vitals Were     Pulse Respirations  Pulse Oximetry             74 12 99%          Blood Pressure from Last 3 Encounters:   04/13/17 112/62   03/27/17 131/70   02/27/17 122/60    Weight from Last 3 Encounters:   03/27/17 240 lb 11.2 oz (109.2 kg)   02/27/17 243 lb 6.4 oz (110.4 kg)   01/30/17 245 lb 1.6 oz (111.2 kg)              Today, you had the following     No orders found for display       Primary Care Provider Office Phone # Fax #    Susan Osullivan [phone]       [address] [address]        Thank you!     Thank you for choosing [facility]  for your care. Our goal is always to provide you with excellent care. Hearing back from our patients is one way we can continue to improve our services. Please take a few minutes to complete the written survey that you may receive in the mail after your visit with us. Thank you!             Your Updated Medication List - Protect others around you: Learn how to safely use, store and throw away your medicines at www.disposemymeds.org.          This list is accurate as of: 4/13/17  9:43 AM.  Always use your most recent med list.                   Brand Name Dispense Instructions for use    allopurinol 100 MG tablet    ZYLOPRIM     Take 100 mg by mouth daily.       ASPIR-81 PO      Take  by mouth.       cetirizine 10 MG tablet    zyrTEC     Take 10 mg by mouth daily       FISH OIL PO      1000 mg once a day       GLUCOSAMINE CHONDROITIN JOINT PO      Take 1 tablet by mouth       indomethacin 25 MG capsule    INDOCIN     Take 25 mg by mouth 2 times daily as needed       LIPITOR PO      Take 80 mg by mouth daily       lisinopril 10 MG tablet    PRINIVIL/ZESTRIL     Take 40 mg by mouth daily       METFORMIN HCL ER PO      500 mg once a day       MULTIVITAMIN PO      Take 1 tablet by mouth daily       PLAVIX PO          VITAMIN D (CHOLECALCIFEROL) PO      Take 5,000 Units by mouth daily

## 2017-04-24 ENCOUNTER — HOSPITAL ENCOUNTER (OUTPATIENT)
Dept: EDUCATION SERVICES | Facility: HOSPITAL | Age: 67
Discharge: HOME | End: 2017-04-24
Attending: NURSE PRACTITIONER | Admitting: FAMILY MEDICINE
Payer: COMMERCIAL

## 2017-04-24 VITALS
WEIGHT: 240.4 LBS | DIASTOLIC BLOOD PRESSURE: 67 MMHG | HEART RATE: 82 BPM | SYSTOLIC BLOOD PRESSURE: 125 MMHG | OXYGEN SATURATION: 94 % | BODY MASS INDEX: 34.41 KG/M2 | HEIGHT: 70 IN

## 2017-04-24 DIAGNOSIS — E11.9 TYPE 2 DIABETES MELLITUS WITHOUT COMPLICATION, WITHOUT LONG-TERM CURRENT USE OF INSULIN (H): Primary | ICD-10-CM

## 2017-04-24 PROCEDURE — G0108 DIAB MANAGE TRN  PER INDIV: HCPCS | Performed by: DIETITIAN, REGISTERED

## 2017-04-24 ASSESSMENT — PAIN SCALES - GENERAL: PAINLEVEL: NO PAIN (0)

## 2017-04-24 NOTE — NURSING NOTE
"Chief Complaint   Patient presents with     Diabetes     session 4       Initial /67 (BP Location: Right arm, Patient Position: Chair, Cuff Size: Adult Large)  Pulse 82  Ht 1.765 m (5' 9.5\")  Wt 109 kg (240 lb 6.4 oz)  SpO2 94%  BMI 34.99 kg/m2 Estimated body mass index is 34.99 kg/(m^2) as calculated from the following:    Height as of this encounter: 1.765 m (5' 9.5\").    Weight as of this encounter: 109 kg (240 lb 6.4 oz).  Medication Reconciliation: complete   Sydney Amaya      "

## 2017-04-24 NOTE — IP AVS SNAPSHOT
[facility]    [address] [place] [address]    Phone:  [phone]    Fax:  [phone]                                       After Visit Summary   4/24/2017    Joon Hernandez Sr    MRN: [id number]           After Visit Summary Signature Page     I have received my discharge instructions, and my questions have been answered. I have discussed any challenges I see with this plan with the nurse or doctor.    ..........................................................................................................................................  Patient/Patient Representative Signature      ..........................................................................................................................................  Patient Representative Print Name and Relationship to Patient    ..................................................               ................................................  Date                                            Time    ..........................................................................................................................................  Reviewed by Signature/Title    ...................................................              ..............................................  Date                                                            Time

## 2017-04-24 NOTE — DISCHARGE INSTRUCTIONS
-Keep limiting the carbohydrates in your diet.    -Be as active as you can - exercise goal is 30 minutes most days of the week.    -Test 1x/day - alternate times.   -Target levels are fasting and before meals , 2 hours after meals less than 180.    -A1c was 6.2% which is down from 6.7% in January.   -Weight today was 240#.     -Follow up annually and as needed.    -Call with any questions - JENNY Arvizu, -[phone]

## 2017-04-24 NOTE — IP AVS SNAPSHOT
MRN:[id number]                      After Visit Summary   4/24/2017    Joon Hernandez     MRN: [id number]           Thank you!     Thank you for choosing [facility] for your care. Our goal is always to provide you with excellent care. Hearing back from our patients is one way we can continue to improve our services. Please take a few minutes to complete the written survey that you may receive in the mail after you visit with us. Thank you!        Patient Information     Date Of Birth          1950        About your hospital stay     You were admitted on:  April 24, 2017 You last received care in the:  [facility]    You were discharged on:  April 24, 2017       Who to Call     For medical emergencies, please call 911.  For non-urgent questions about your medical care, please call your primary care provider or clinic, [phone]          Attending Provider     Provider Specialty    Dipika Valle NP --       Primary Care Provider Office Phone # Fax #    Susan Osullivan [phone]       [address] [address]        Further instructions from your care team       -Keep limiting the carbohydrates in your diet.    -Be as active as you can - exercise goal is 30 minutes most days of the week.    -Test 1x/day - alternate times.   -Target levels are fasting and before meals , 2 hours after meals less than 180.    -A1c was 6.2% which is down from 6.7% in January.   -Weight today was 240#.     -Follow up annually and as needed.    -Call with any questions - JENNY Arvizu, E [phone]      Pending Results     No orders found from 4/22/2017 to 4/25/2017.            Admission Information     Date & Time Provider Department Dept. Phone    4/24/2017 Dipika Valle NP [facility] [phone]      Your Vitals Were     Blood Pressure Pulse Height Weight Pulse Oximetry BMI (Body Mass Index)    125/67 (BP Location:  "Right arm, Patient Position: Chair, Cuff Size: Adult Large) 82 1.765 m (5' 9.5\") 109 kg (240 lb 6.4 oz) 94% 34.99 kg/m2      [employer] Information     [employer] gives you secure access to your electronic health record. If you see a primary care provider, you can also send messages to your care team and make appointments. If you have questions, please call your primary care clinic.  If you do not have a primary care provider, please call [phone] and they will assist you.        Care EveryWhere ID     This is your Care EveryWhere ID. This could be used by other organizations to access your [facility] medical records  [id number]           Review of your medicines      UNREVIEWED medicines. Ask your doctor about these medicines        Dose / Directions    allopurinol 100 MG tablet   Commonly known as:  ZYLOPRIM        Dose:  100 mg   Take 100 mg by mouth daily.   Refills:  0       ASPIR-81 PO        Take  by mouth.   Refills:  0       cetirizine 10 MG tablet   Commonly known as:  zyrTEC        Dose:  10 mg   Take 10 mg by mouth daily   Refills:  0       FISH OIL PO        1000 mg once a day   Refills:  0       GLUCOSAMINE CHONDROITIN JOINT PO        Dose:  1 tablet   Take 1 tablet by mouth   Refills:  0       indomethacin 25 MG capsule   Commonly known as:  INDOCIN        Dose:  25 mg   Take 25 mg by mouth 2 times daily as needed   Refills:  0       LIPITOR PO        Dose:  80 mg   Take 80 mg by mouth daily   Refills:  0       lisinopril 10 MG tablet   Commonly known as:  PRINIVIL/ZESTRIL        Dose:  40 mg   Take 40 mg by mouth daily   Refills:  0       METFORMIN HCL ER PO        500 mg once a day   Refills:  0       MULTIVITAMIN PO        Dose:  1 tablet   Take 1 tablet by mouth daily   Refills:  0       VITAMIN D (CHOLECALCIFEROL) PO        Dose:  5000 Units   Take 5,000 Units by mouth daily   Refills:  0                Protect others around you: Learn how to safely use, store and throw away your medicines at " www.disposemymeds.org.             Medication List: This is a list of all your medications and when to take them. Check marks below indicate your daily home schedule. Keep this list as a reference.      Medications           Morning Afternoon Evening Bedtime As Needed    allopurinol 100 MG tablet   Commonly known as:  ZYLOPRIM   Take 100 mg by mouth daily.                                ASPIR-81 PO   Take  by mouth.                                cetirizine 10 MG tablet   Commonly known as:  zyrTEC   Take 10 mg by mouth daily                                FISH OIL PO   1000 mg once a day                                GLUCOSAMINE CHONDROITIN JOINT PO   Take 1 tablet by mouth                                indomethacin 25 MG capsule   Commonly known as:  INDOCIN   Take 25 mg by mouth 2 times daily as needed                                LIPITOR PO   Take 80 mg by mouth daily                                lisinopril 10 MG tablet   Commonly known as:  PRINIVIL/ZESTRIL   Take 40 mg by mouth daily                                METFORMIN HCL ER PO   500 mg once a day                                MULTIVITAMIN PO   Take 1 tablet by mouth daily                                VITAMIN D (CHOLECALCIFEROL) PO   Take 5,000 Units by mouth daily

## 2017-04-24 NOTE — PROGRESS NOTES
"Pt here for Session 4.     BG readings as follows:   Fasting-133, 123, 130, 130  Before meals-115, 195, 119, 190, 112  Post meals-119, 146, 123, 212  Pt states that high levels were when he ate sweets or else tested earlier than 2 hours.  Overall average is 140.    Blood pressure 125/67, pulse 82, height 1.765 m (5' 9.5\"), weight 109 kg (240 lb 6.4 oz), SpO2 94 %.  Weight is down about 4# from initial session.      Current diabetes medications: Metformin  mg with breakfast.     Readiness to learn: willing.  Wife very supportive.     Barriers to learning: none.      Pt actively participated in the session and continues to demonstrate motivation.     Topics covered: diabetes success plan, behavior change goal review, meal planning and importance of long term compliance, developing problem solving skills, stress and how it affects blood sugar, relationship between diabetes and depression along with symptoms of depression and how they affect diabetes self-care. Rationale for consistent self-care and regular diabetes care visits, identifying medical tests/exams needed for regular diabetes care and community resources for ongoing education and support.     Pts A1c 6.2% (4-10-17) which is down from 6.7% (1-5-17).      PHQ-9 completed with score of 8.    Eye exam 3/2017.  Foot exam 11/2016.  Pt does not smoke.      Follow up Diabetes self-management support plan: Continue to limit carbohydrates in diet.  Increase activity to 30 minutes most days of the week.  Test 1x/day at alternating times.  Pt will follow with provider q 3 months for A1c checks.     Will follow annually and prn.      Total visit time: 30 minutes.     JENNY Arvizu, CDE    "

## 2017-06-13 ENCOUNTER — HOSPITAL ENCOUNTER (EMERGENCY)
Facility: HOSPITAL | Age: 67
Discharge: HOME | End: 2017-06-13
Attending: FAMILY MEDICINE | Admitting: FAMILY MEDICINE
Payer: COMMERCIAL

## 2017-06-13 VITALS
DIASTOLIC BLOOD PRESSURE: 76 MMHG | SYSTOLIC BLOOD PRESSURE: 129 MMHG | RESPIRATION RATE: 18 BRPM | OXYGEN SATURATION: 94 % | TEMPERATURE: 97.5 F | HEART RATE: 79 BPM

## 2017-06-13 DIAGNOSIS — R04.0 EPISTAXIS: ICD-10-CM

## 2017-06-13 PROCEDURE — 99283 EMERGENCY DEPT VISIT LOW MDM: CPT | Performed by: FAMILY MEDICINE

## 2017-06-13 PROCEDURE — 99282 EMERGENCY DEPT VISIT SF MDM: CPT

## 2017-06-13 RX ORDER — LISINOPRIL 30 MG/1
30 TABLET ORAL
COMMUNITY
Start: 2017-01-12

## 2017-06-13 RX ORDER — GLUCOSAMINE HCL/CHONDROITIN SU 500-400 MG
1 CAPSULE ORAL
COMMUNITY
Start: 2017-01-20

## 2017-06-13 ASSESSMENT — ENCOUNTER SYMPTOMS
GASTROINTESTINAL NEGATIVE: 1
MUSCULOSKELETAL NEGATIVE: 1
HEMATOLOGIC/LYMPHATIC NEGATIVE: 1
PSYCHIATRIC NEGATIVE: 1
CARDIOVASCULAR NEGATIVE: 1
RESPIRATORY NEGATIVE: 1
ENDOCRINE NEGATIVE: 1
ALLERGIC/IMMUNOLOGIC NEGATIVE: 1
CONSTITUTIONAL NEGATIVE: 1
EYES NEGATIVE: 1
NEUROLOGICAL NEGATIVE: 1

## 2017-06-13 NOTE — ED NOTES
"Pt to room 1 ambulatory with wife accompanying. Pt notes that he developed a nose bleed around 0300 this AM. Pt's wife notes that this is the \"third one in 7 days\". Upon arrival to room bleeding has stopped and pt denies any drainage in back of throat. Bleed was from the left nare initially and states that the left side \"backed up\" and blood came out the right side. Pt denies pain or discomfort and no known injury to face.   "

## 2017-06-13 NOTE — ED PROVIDER NOTES
History     Chief Complaint   Patient presents with     Epistaxis     started around 0300, third one this week     HPI Comments: Woke up with nose bleed at 0300 today. Bleeding stopped upon arrival to ED. Pt states he had a nose bleed earlier this week. Denies prior h/o nose bleeds. No nasal trauma, nose blowing or bad cold symptoms or allergies.  Pt does use CPAP device and states last episode was at night while using CPAP. Pt takes a baby aspirin daily.    The history is provided by the patient. No  was used.     Joon Hernandez Sr is a 67 year old male who presents with above concerns.    I have reviewed the Medications, Allergies, Past Medical and Surgical History, and Social History in the Epic system.    Allergies:   Allergies   Allergen Reactions     Simvastatin Cramps         Current Facility-Administered Medications on File Prior to Encounter:  lactated ringers infusion     Current Outpatient Prescriptions on File Prior to Encounter:  VITAMIN D, CHOLECALCIFEROL, PO Take 5,000 Units by mouth daily   Omega-3 Fatty Acids (FISH OIL PO) 1000 mg once a day   METFORMIN HCL ER  mg once a day   Glucos-Chondroit-Hyaluron-MSM (GLUCOSAMINE CHONDROITIN JOINT PO) Take 1 tablet by mouth 2 times daily    Multiple Vitamins-Minerals (MULTIVITAMIN PO) Take 1 tablet by mouth daily   Atorvastatin Calcium (LIPITOR PO) Take 80 mg by mouth daily    cetirizine (ZYRTEC) 10 MG tablet Take 10 mg by mouth daily as needed    allopurinol (ZYLOPRIM) 100 MG tablet Take 100 mg by mouth daily.   Aspirin (ASPIR-81 PO) Take  by mouth.   indomethacin (INDOCIN) 25 MG capsule Take 25 mg by mouth 2 times daily as needed        Patient Active Problem List   Diagnosis     ACP (advance care planning)       Past Surgical History:   Procedure Laterality Date     ADENOIDECTOMY       COLONOSCOPY  6/9/2014    Procedure: COLONOSCOPY;  Surgeon: Pilar Rivera MD;  Location: [facility]     tonsillectomy         Social History  "  Substance Use Topics     Smoking status: Former Smoker     Packs/day: 0.50     Years: 15.00     Types: Cigars     Quit date: 4/2/1985     Smokeless tobacco: Never Used     Alcohol use No      Comment: \"rarely\"       Most Recent Immunizations   Administered Date(s) Administered     DT (PEDS <7y) 12/13/1959     Influenza (IIV3) 10/01/2013     OPV 05/20/1958     Pneumococcal 23 valent 03/10/2011     TD (ADULT, 7+) 12/08/1977     Tdap (Adacel,Boostrix) 03/10/2011     Zoster vaccine, live 04/01/2014       BMI: Estimated body mass index is 34.99 kg/(m^2) as calculated from the following:    Height as of 4/24/17: 1.765 m (5' 9.5\").    Weight as of 4/24/17: 109 kg (240 lb 6.4 oz).      Review of Systems   Constitutional: Negative.    HENT: Positive for nosebleeds.    Eyes: Negative.    Respiratory: Negative.    Cardiovascular: Negative.    Gastrointestinal: Negative.    Endocrine: Negative.    Genitourinary: Negative.    Musculoskeletal: Negative.    Skin: Negative.    Allergic/Immunologic: Negative.    Neurological: Negative.    Hematological: Negative.    Psychiatric/Behavioral: Negative.        Physical Exam   BP: 152/99  Pulse: 79  Heart Rate: 72  Temp: 97.5  F (36.4  C)  Resp: 18  SpO2: 95 %  Physical Exam   Constitutional: He is oriented to person, place, and time. He appears well-developed and well-nourished. No distress.   HENT:   Head: Normocephalic and atraumatic.   Mouth/Throat: Oropharynx is clear and moist.   Dried blood left nares, no active bleeding.  Nasal septum mucosa erythematous.   Eyes: Conjunctivae and EOM are normal. Pupils are equal, round, and reactive to light.   Neck: Normal range of motion.   Pulmonary/Chest: Effort normal.   Neurological: He is alert and oriented to person, place, and time.   Skin: Skin is warm and dry. He is not diaphoretic.   Psychiatric: He has a normal mood and affect. His behavior is normal.       ED Course     ED Course   Pt observed in ED x 1 hour without recurrence of " nosebleed.  Procedures            Critical Care time:               Labs Ordered and Resulted from Time of ED Arrival Up to the Time of Departure from the ED - No data to display    Assessments & Plan (with Medical Decision Making)     I have reviewed the nursing notes.    I have reviewed the findings, diagnosis, plan and need for follow up with the patient.  Reviewed with pt and wife proper way to apply pressure to nose if bleeding should recur. Advise humidifier or vaporizer, saline nose spray. Pt will add humidity to CPAP.  F/u with ENT this week.  Return to ER if needed. Avoid nose blowing or manipulation of nose for 24 hours.      New Prescriptions    No medications on file       Final diagnoses:   Epistaxis       6/13/2017   [facility]     Ada Vieyra,   06/13/17 [id number]

## 2017-06-13 NOTE — DISCHARGE INSTRUCTIONS
Nosebleed (Adult)    Bleeding from the nose most commonly occurs due to injury or drying and cracking of the inner lining of the nose. Most nosebleeds are due to dry air or nose-picking. They can occur during a common cold or an allergy attack. They can also occur on a very hot day, or from dry air in the winter.  If the bleeding site is found, it may be cauterized (treated to cause a blood clot to form). This may be done with a chemical, heat, or electricity. If the bleeding continues after the site is cauterized, or if the site cannot be found, packing may be placed in your nose. This is to apply pressure and stop the bleeding. The packing may be made of gauze or sponge. A small balloon catheter is sometimes used. These must be removed by your doctor. Some types of packing dissolve on their own.  Home care    If packing was put in your nose, unless told otherwise, do not pull on it or try to remove it yourself. You will be given an appointment to have it removed. You may also have been given antibiotics to prevent a sinus infection. If so, finish all of the medicine.    Do not blow your nose for 12 hours after the bleeding stops. This will allow a strong blood clot to form. Do not pick your nose. This may restart bleeding.    Avoid drinking alcohol and hot liquids for the next two days. Alcohol or hot liquids in your mouth can dilate blood vessels in your nose. This can cause bleeding to start again.    Do not take ibuprofen, naproxen, or aspirin-containing medicines. These thin the blood and may promote nose bleeding. You may take acetaminophen for pain, unless another pain medicine was prescribed.    If the bleeding starts again, sit up and lean forward to prevent swallowing blood. Pinch your nose tightly on both sides, as depicted above, for 10 to 15 minutes.  Time yourself, and don t release the pressure on your nose until 10 minutes is up. If bleeding is not controlled, continue to pinch your nose and call  your health care provider or return to this facility.    If you have a cold, allergies, or dry nasal membranes, lubricate the nasal passages. Apply a small amount of petroleum jelly inside the nose with a cotton swab twice a day (morning and night).    Avoid overheating your home, which can dry the air and worsen your condition.    A humidifier in the room where you sleep will add moisture to the air.    Use a saline nasal spray to keep nasal passages moist.    Do not pick your nose. Keep fingernails trimmed to decrease risk of bleeds.  Follow-up care  Follow up with your health care provider, or as advised. Nasal packing should be rechecked or removed within 2 to 3 days.  When to seek medical advice  Call your health care provider right away if any of these occur.    Another nosebleed that you cannot control    Dizziness, weakness or fainting    You become tired or confused    Fever of 100.4 F (38 C) or higher, or as directed by your health care provider    Headache    Sinus or facial pain    Shortness of breath or trouble breathing    1530-6472 [employer]. [address] [place] [address], [place] [address]. All rights reserved. This information is not intended as a substitute for professional medical care. Always follow your healthcare professional's instructions.

## 2017-06-13 NOTE — ED AVS SNAPSHOT
[facility]    [address] [address]    Phone:  [phone]                                       Joon Hernandez    MRN: [id number]    Department:  [facility]   Date of Visit:  6/13/2017           After Visit Summary Signature Page     I have received my discharge instructions, and my questions have been answered. I have discussed any challenges I see with this plan with the nurse or doctor.    ..........................................................................................................................................  Patient/Patient Representative Signature      ..........................................................................................................................................  Patient Representative Print Name and Relationship to Patient    ..................................................               ................................................  Date                                            Time    ..........................................................................................................................................  Reviewed by Signature/Title    ...................................................              ..............................................  Date                                                            Time

## 2017-06-13 NOTE — ED AVS SNAPSHOT
[facility]    [address]    [place] [address]    Phone:  [phone]                                       Joon Hernandez Sr   MRN: [id number]    Department:  [facility]   Date of Visit:  6/13/2017           Patient Information     Date Of Birth          1950        Your diagnoses for this visit were:     Epistaxis        You were seen by Ada Vieyra DO.      Follow-up Information     Follow up with Veronica Montiel MD. Call today.    Specialty:  Otolaryngology    Why:  For evaluation of recurrent nosebleeds    Contact information:    [facility]  [address]  [place] [address]  [phone]          Follow up with ENT. Schedule an appointment as soon as possible for a visit in 1 day.        Discharge Instructions         Nosebleed (Adult)    Bleeding from the nose most commonly occurs due to injury or drying and cracking of the inner lining of the nose. Most nosebleeds are due to dry air or nose-picking. They can occur during a common cold or an allergy attack. They can also occur on a very hot day, or from dry air in the winter.  If the bleeding site is found, it may be cauterized (treated to cause a blood clot to form). This may be done with a chemical, heat, or electricity. If the bleeding continues after the site is cauterized, or if the site cannot be found, packing may be placed in your nose. This is to apply pressure and stop the bleeding. The packing may be made of gauze or sponge. A small balloon catheter is sometimes used. These must be removed by your doctor. Some types of packing dissolve on their own.  Home care    If packing was put in your nose, unless told otherwise, do not pull on it or try to remove it yourself. You will be given an appointment to have it removed. You may also have been given antibiotics to prevent a sinus infection. If so, finish all of the medicine.    Do not blow your nose for 12 hours after the  bleeding stops. This will allow a strong blood clot to form. Do not pick your nose. This may restart bleeding.    Avoid drinking alcohol and hot liquids for the next two days. Alcohol or hot liquids in your mouth can dilate blood vessels in your nose. This can cause bleeding to start again.    Do not take ibuprofen, naproxen, or aspirin-containing medicines. These thin the blood and may promote nose bleeding. You may take acetaminophen for pain, unless another pain medicine was prescribed.    If the bleeding starts again, sit up and lean forward to prevent swallowing blood. Pinch your nose tightly on both sides, as depicted above, for 10 to 15 minutes.  Time yourself, and don t release the pressure on your nose until 10 minutes is up. If bleeding is not controlled, continue to pinch your nose and call your health care provider or return to this facility.    If you have a cold, allergies, or dry nasal membranes, lubricate the nasal passages. Apply a small amount of petroleum jelly inside the nose with a cotton swab twice a day (morning and night).    Avoid overheating your home, which can dry the air and worsen your condition.    A humidifier in the room where you sleep will add moisture to the air.    Use a saline nasal spray to keep nasal passages moist.    Do not pick your nose. Keep fingernails trimmed to decrease risk of bleeds.  Follow-up care  Follow up with your health care provider, or as advised. Nasal packing should be rechecked or removed within 2 to 3 days.  When to seek medical advice  Call your health care provider right away if any of these occur.    Another nosebleed that you cannot control    Dizziness, weakness or fainting    You become tired or confused    Fever of 100.4 F (38 C) or higher, or as directed by your health care provider    Headache    Sinus or facial pain    Shortness of breath or trouble breathing    6933-8652 [employer]. [address] [place] [address], [place] [address]. All  rights reserved. This information is not intended as a substitute for professional medical care. Always follow your healthcare professional's instructions.          Future Appointments        Provider Department Dept Phone Center    4/24/2018 11:30 AM Annabella Avila RD [facility] [phone] [facility]         Review of your medicines      Our records show that you are taking the medicines listed below. If these are incorrect, please call your family doctor or clinic.        Dose / Directions Last dose taken    allopurinol 100 MG tablet   Commonly known as:  ZYLOPRIM   Dose:  100 mg        Take 100 mg by mouth daily.   Refills:  0        ASPIR-81 PO        Take  by mouth.   Refills:  0        BLOOD GLUCOSE TEST STRIPS Strp   Dose:  1 each        1 each   Refills:  0        cetirizine 10 MG tablet   Commonly known as:  zyrTEC   Dose:  10 mg        Take 10 mg by mouth daily as needed   Refills:  0        FISH OIL PO        1000 mg once a day   Refills:  0        GLUCOSAMINE CHONDROITIN JOINT PO   Dose:  1 tablet        Take 1 tablet by mouth 2 times daily   Refills:  0        indomethacin 25 MG capsule   Commonly known as:  INDOCIN   Dose:  25 mg        Take 25 mg by mouth 2 times daily as needed   Refills:  0        Lancets 30G Misc   Dose:  1 each        1 each   Refills:  0        LIPITOR PO   Dose:  80 mg        Take 80 mg by mouth daily   Refills:  0        lisinopril 30 MG tablet   Commonly known as:  PRINIVIL,ZESTRIL   Dose:  30 mg        Take 30 mg by mouth   Refills:  0        METFORMIN HCL ER PO        500 mg once a day   Refills:  0        MULTIVITAMIN PO   Dose:  1 tablet        Take 1 tablet by mouth daily   Refills:  0        VITAMIN D (CHOLECALCIFEROL) PO   Dose:  5000 Units        Take 5,000 Units by mouth daily   Refills:  0                Orders Needing Specimen Collection     None      Pending Results     No orders found from 6/11/2017 to 6/14/2017.            Pending Culture Results      No orders found from 6/11/2017 to 6/14/2017.            Thank you for choosing [facility]       Thank you for choosing [facility] for your care. Our goal is always to provide you with excellent care. Hearing back from our patients is one way we can continue to improve our services. Please take a few minutes to complete the written survey that you may receive in the mail after you visit with us. Thank you!        [employer] Information     [employer] gives you secure access to your electronic health record. If you see a primary care provider, you can also send messages to your care team and make appointments. If you have questions, please call your primary care clinic.  If you do not have a primary care provider, please call [phone] and they will assist you.        Care EveryWhere ID     This is your Care EveryWhere ID. This could be used by other organizations to access your [facility] medical records  [id number]        After Visit Summary       This is your record. Keep this with you and show to your community pharmacist(s) and doctor(s) at your next visit.

## 2017-06-15 ENCOUNTER — OFFICE VISIT (OUTPATIENT)
Dept: SLEEP MEDICINE | Facility: HOSPITAL | Age: 67
End: 2017-06-15
Attending: INTERNAL MEDICINE
Payer: COMMERCIAL

## 2017-06-15 VITALS
HEART RATE: 70 BPM | RESPIRATION RATE: 12 BRPM | OXYGEN SATURATION: 92 % | SYSTOLIC BLOOD PRESSURE: 118 MMHG | DIASTOLIC BLOOD PRESSURE: 60 MMHG

## 2017-06-15 DIAGNOSIS — G47.33 OBSTRUCTIVE SLEEP APNEA SYNDROME: Primary | ICD-10-CM

## 2017-06-15 PROCEDURE — 99211 OFF/OP EST MAY X REQ PHY/QHP: CPT

## 2017-06-15 PROCEDURE — 99212 OFFICE O/P EST SF 10 MIN: CPT | Performed by: INTERNAL MEDICINE

## 2017-06-15 NOTE — MR AVS SNAPSHOT
After Visit Summary   6/15/2017    Joon Hernandez     MRN: [id number]           Patient Information     Date Of Birth          1950        Visit Information        Provider Department      6/15/2017 11:00 AM Gerry Goff MD [facility]        Today's Diagnoses     Obstructive sleep apnea syndrome    -  1       Follow-ups after your visit        Your next 10 appointments already scheduled     Jun 22, 2017 10:00 AM CDT   (Arrive by 9:45 AM)   New Visit with Emily Seay PA-C   [facility] ([facility] )    [address] [place] [id number] [phone]            Apr 24, 2018 11:30 AM CDT   (Arrive by 11:15 AM)   Return Visit with Annabella Avila RD   [facility] ([facility] )    [address] [place] [address] [phone]              Who to contact     If you have questions or need follow up information about today's clinic visit or your schedule please contact [facility] directly at [phone].  Normal or non-critical lab and imaging results will be communicated to you by [employer]ier[employer], letter or phone within 4 business days after the clinic has received the results. If you do not hear from us within 7 days, please contact the clinic through [employer] or phone. If you have a critical or abnormal lab result, we will notify you by phone as soon as possible.  Submit refill requests through [employer] or call your pharmacy and they will forward the refill request to us. Please allow 3 business days for your refill to be completed.          Additional Information About Your Visit        Haierhart Information     [employer] gives you secure access to your electronic health record. If you see a primary care provider, you can also send messages to your care team and make appointments. If you have questions, please call your primary care clinic.  If you do not have a primary care provider, please call [phone] and they will assist  you.        Care EveryWhere ID     This is your Care EveryWhere ID. This could be used by other organizations to access your [facility] medical records  [id number]        Your Vitals Were     Pulse Respirations Pulse Oximetry             70 12 92%          Blood Pressure from Last 3 Encounters:   06/15/17 118/60   06/13/17 129/76   04/24/17 125/67    Weight from Last 3 Encounters:   04/24/17 240 lb 6.4 oz (109 kg)   03/27/17 240 lb 11.2 oz (109.2 kg)   02/27/17 243 lb 6.4 oz (110.4 kg)              Today, you had the following     No orders found for display       Primary Care Provider Office Phone # Fax #    Susan Osullivan [phone]       [address] [address]        Thank you!     Thank you for choosing [facility]  for your care. Our goal is always to provide you with excellent care. Hearing back from our patients is one way we can continue to improve our services. Please take a few minutes to complete the written survey that you may receive in the mail after your visit with us. Thank you!             Your Updated Medication List - Protect others around you: Learn how to safely use, store and throw away your medicines at www.disposemymeds.org.          This list is accurate as of: 6/15/17 11:12 AM.  Always use your most recent med list.                   Brand Name Dispense Instructions for use    allopurinol 100 MG tablet    ZYLOPRIM     Take 100 mg by mouth daily.       ASPIR-81 PO      Take  by mouth.       BLOOD GLUCOSE TEST STRIPS Strp      1 each       cetirizine 10 MG tablet    zyrTEC     Take 10 mg by mouth daily as needed       FISH OIL PO      1000 mg once a day       GLUCOSAMINE CHONDROITIN JOINT PO      Take 1 tablet by mouth 2 times daily       indomethacin 25 MG capsule    INDOCIN     Take 25 mg by mouth 2 times daily as needed       Lancets 30G Misc      1 each       LIPITOR PO      Take 80 mg by mouth daily       lisinopril 30 MG tablet     PRINIVIL,ZESTRIL     Take 30 mg by mouth       METFORMIN HCL ER PO      500 mg once a day       MULTIVITAMIN PO      Take 1 tablet by mouth daily       VITAMIN D (CHOLECALCIFEROL) PO      Take 5,000 Units by mouth daily

## 2017-06-15 NOTE — PROGRESS NOTES
Doing well with cpap, compliance over 90%, minimal leaks, AHI less than 1, also feel much more rested wearing it. Some recent nosebleeds, seeing an ENT soon.   /60  Pulse 70  Resp 12  SpO2 92%    A/ LULY well treated.

## 2017-06-16 NOTE — NURSING NOTE
Patient ID checked with name and date of birth. Reviewed allergies and home medications. Took Vitals and brief history.   Printed out the compliance download.

## 2017-06-18 ENCOUNTER — HOSPITAL ENCOUNTER (EMERGENCY)
Facility: HOSPITAL | Age: 67
Discharge: HOME | End: 2017-06-18
Payer: COMMERCIAL

## 2017-06-18 VITALS — OXYGEN SATURATION: 96 % | SYSTOLIC BLOOD PRESSURE: 156 MMHG | DIASTOLIC BLOOD PRESSURE: 96 MMHG

## 2017-06-18 DIAGNOSIS — R04.0 EPISTAXIS: ICD-10-CM

## 2017-06-18 PROBLEM — G47.30 SLEEP APNEA: Status: ACTIVE | Noted: 2017-01-12

## 2017-06-18 PROBLEM — I63.9 ISCHEMIC STROKE (H): Status: ACTIVE | Noted: 2017-01-05

## 2017-06-18 LAB
BASOPHILS # BLD AUTO: 0 10E9/L (ref 0–0.2)
BASOPHILS NFR BLD AUTO: 0.4 %
DIFFERENTIAL METHOD BLD: ABNORMAL
EOSINOPHIL # BLD AUTO: 0.2 10E9/L (ref 0–0.7)
EOSINOPHIL NFR BLD AUTO: 2.6 %
ERYTHROCYTE [DISTWIDTH] IN BLOOD BY AUTOMATED COUNT: 12.8 % (ref 10–15)
HCT VFR BLD AUTO: 37.7 % (ref 40–53)
HGB BLD-MCNC: 13.1 G/DL (ref 13.3–17.7)
IMM GRANULOCYTES # BLD: 0 10E9/L (ref 0–0.4)
IMM GRANULOCYTES NFR BLD: 0.3 %
INR PPP: 1.03 (ref 0.8–1.2)
LYMPHOCYTES # BLD AUTO: 2.4 10E9/L (ref 0.8–5.3)
LYMPHOCYTES NFR BLD AUTO: 34.3 %
MCH RBC QN AUTO: 30.7 PG (ref 26.5–33)
MCHC RBC AUTO-ENTMCNC: 34.7 G/DL (ref 31.5–36.5)
MCV RBC AUTO: 88 FL (ref 78–100)
MONOCYTES # BLD AUTO: 0.5 10E9/L (ref 0–1.3)
MONOCYTES NFR BLD AUTO: 7 %
NEUTROPHILS # BLD AUTO: 3.9 10E9/L (ref 1.6–8.3)
NEUTROPHILS NFR BLD AUTO: 55.4 %
NRBC # BLD AUTO: 0 10*3/UL
NRBC BLD AUTO-RTO: 0 /100
PLATELET # BLD AUTO: 149 10E9/L (ref 150–450)
RBC # BLD AUTO: 4.27 10E12/L (ref 4.4–5.9)
WBC # BLD AUTO: 7 10E9/L (ref 4–11)

## 2017-06-18 PROCEDURE — 36415 COLL VENOUS BLD VENIPUNCTURE: CPT

## 2017-06-18 PROCEDURE — 30901 CONTROL OF NOSEBLEED: CPT | Mod: LT

## 2017-06-18 PROCEDURE — 25000132 ZZH RX MED GY IP 250 OP 250 PS 637

## 2017-06-18 PROCEDURE — 27210182 ZZH KIT NASAL PACKING

## 2017-06-18 PROCEDURE — 85610 PROTHROMBIN TIME: CPT

## 2017-06-18 PROCEDURE — 99283 EMERGENCY DEPT VISIT LOW MDM: CPT

## 2017-06-18 PROCEDURE — 85025 COMPLETE CBC W/AUTO DIFF WBC: CPT

## 2017-06-18 RX ORDER — OXYMETAZOLINE HYDROCHLORIDE 0.05 G/100ML
2 SPRAY NASAL ONCE
Status: COMPLETED | OUTPATIENT
Start: 2017-06-18 | End: 2017-06-18

## 2017-06-18 RX ADMIN — OXYMETAZOLINE HYDROCHLORIDE 2 SPRAY: 5 SPRAY NASAL at 19:30

## 2017-06-18 ASSESSMENT — ENCOUNTER SYMPTOMS
SHORTNESS OF BREATH: 0
DIFFICULTY URINATING: 0
ABDOMINAL PAIN: 0
NECK STIFFNESS: 0
CONFUSION: 0
FEVER: 0
COLOR CHANGE: 0
HEADACHES: 0
EYE REDNESS: 0
NERVOUS/ANXIOUS: 1
ARTHRALGIAS: 0

## 2017-06-18 NOTE — ED AVS SNAPSHOT
[facility]    [address] [address]    Phone:  [phone]                                       Joon Hernandez Sr   MRN: [id number]    Department:  [facility]   Date of Visit:  6/18/2017           Patient Information     Date Of Birth          1950        Your diagnoses for this visit were:     Epistaxis        You were seen by Susan Dobbins APRN FNP.      Follow-up Information     Follow up with Susan Osullivan MD In 2 days.    Specialty:  Family Practice    Why:  recheck    Contact information:    [facility]  [address] [address] [phone]          Follow up with [facility].    Specialty:  EMERGENCY MEDICINE    Why:  As needed, If symptoms worsen    Contact information:    [address] [address] [phone]    Additional information:    From [facility]: Take [address]. Turn left at [address]. Turn left at the first stoplight on [address]. At the first stop sign, take a right onto [address]. Take a left into the parking lot and continue through until you reach the North enterance of the building.       From [place]: Take [address]. Take the [address] ramp towards [place]. Turn left onto [address]. Take a slight right onto [address]. Turn left at the first stoplight on [address]. At the first stop sign, take a right onto [address]. Take a left into the parking lot and continue through until you reach the North enterance of the building.       From [place]: Take [address]. Take a right at [address]. At the first stop sign, take a right onto [address]. Take a left into the parking lot and continue through until you reach the North enterance of the building.         Discharge Instructions           See attached for home care  Tylenol for pain  Keep packing clean and intact until f/u  ENT  or PCP f/u in 2 days for recheck and packing removal  Return to ED with worsening sx.     Nosebleed (Adult)    Bleeding from the nose most commonly occurs because of injury or drying and cracking of the inner lining of the nose. Most nosebleeds are because of dry air or nose-picking. They can occur during a common cold or an allergy attack. They can also occur on a very hot day, or from dry air in the winter.  If the bleeding site is found, it may be cauterized. This means it is treated to cause a blood clot to form. This may be done with a chemical, heat, or electricity. If the bleeding continues after the site is cauterized, or if the site cannot be found, packing may be put in your nose. This is to apply pressure and stop the bleeding. The packing may be made of gauze or sponge. A small balloon catheter is sometimes used. These must be removed by your doctor. Some types of packing dissolve on their own.  Home care    If packing was put in your nose, unless told otherwise, do not pull on it or try to remove it yourself. You will be given an appointment to have it removed. You may also have been given antibiotics to prevent a sinus infection. If so, finish all of the medicine.    Do not blow your nose for 12 hours after the bleeding stops. This will allow a strong blood clot to form. Do not pick your nose. This may restart bleeding.    Avoid drinking alcohol and hot liquids for the next 2 days. Alcohol or hot liquids in your mouth can dilate blood vessels in your nose. This can cause bleeding to start again.    Do not take ibuprofen, naproxen, or medicines that contain aspirin. These thin the blood and may cause your nose to bleed. You may take acetaminophen for pain, unless another pain medicine was prescribed.    If the bleeding starts again, sit up and lean forward to prevent swallowing blood. Pinch your nose tightly on both sides, as shown above, for 10 to 15 minutes. Time yourself. Don t release the pressure  on your nose until 10 minutes is up. If bleeding does not stop, continue to pinch your nose and call your healthcare provider or return to this facility.    If you have a cold, allergies, or dry nasal membranes, lubricate the nasal passages. Apply a small amount of petroleum jelly inside the nose with a cotton swab twice a day (morning and night).    Avoid overheating your home. This can dry the air and make your condition worse.    Put a humidifier in the room where you sleep. This will add moisture to the air.    Use a saline nasal spray to keep nasal passages moist.    Do not pick your nose. Keep fingernails trimmed to decrease risk of bleeds.    Do not smoke.  Follow-up care  Follow up with your healthcare provider, or as advised. Nasal packing should be rechecked or removed within 2 to 3 days.  When to seek medical advice  Call your healthcare provider right away if any of these occur.    You have another nosebleed that you cannot control    Dizziness, weakness, or fainting    You become tired or confused    Fever of 100.4 F (38 C) or higher, or as directed by your healthcare provider    Headache    Sinus or facial pain    Shortness of breath or trouble breathing  Date Last Reviewed: 3/22/2015    4190-1527 [employer]. [address] [place] [address]. All rights reserved. This information is not intended as a substitute for professional medical care. Always follow your healthcare professional's instructions.          Future Appointments        Provider Department Dept Phone Center    6/22/2017 10:00 AM Emily Seay PA-C [facility] [phone] [facility]    4/24/2018 11:30 AM Annabella Avila RD [facility] [phone] [facility]         Review of your medicines      Our records show that you are taking the medicines listed below. If these are incorrect, please call your family doctor or clinic.        Dose / Directions Last dose taken    allopurinol 100 MG tablet    Commonly known as:  ZYLOPRIM   Dose:  100 mg        Take 100 mg by mouth daily.   Refills:  0        ASPIR-81 PO        Take  by mouth.   Refills:  0        BLOOD GLUCOSE TEST STRIPS Strp   Dose:  1 each        1 each   Refills:  0        cetirizine 10 MG tablet   Commonly known as:  zyrTEC   Dose:  10 mg        Take 10 mg by mouth daily as needed   Refills:  0        FISH OIL PO        1000 mg once a day   Refills:  0        GLUCOSAMINE CHONDROITIN JOINT PO   Dose:  1 tablet        Take 1 tablet by mouth 2 times daily   Refills:  0        indomethacin 25 MG capsule   Commonly known as:  INDOCIN   Dose:  25 mg        Take 25 mg by mouth 2 times daily as needed   Refills:  0        Lancets 30G Misc   Dose:  1 each        1 each   Refills:  0        LIPITOR PO   Dose:  80 mg        Take 80 mg by mouth daily   Refills:  0        lisinopril 30 MG tablet   Commonly known as:  PRINIVIL,ZESTRIL   Dose:  30 mg        Take 30 mg by mouth   Refills:  0        METFORMIN HCL ER PO        500 mg once a day   Refills:  0        MULTIVITAMIN PO   Dose:  1 tablet        Take 1 tablet by mouth daily   Refills:  0        VITAMIN D (CHOLECALCIFEROL) PO   Dose:  5000 Units        Take 5,000 Units by mouth daily   Refills:  0                Procedures and tests performed during your visit     CBC with platelets differential    INR      Orders Needing Specimen Collection     None      Pending Results     No orders found from 6/16/2017 to 6/19/2017.            Pending Culture Results     No orders found from 6/16/2017 to 6/19/2017.            Thank you for choosing [facility]       Thank you for choosing [facility] for your care. Our goal is always to provide you with excellent care. Hearing back from our patients is one way we can continue to improve our services. Please take a few minutes to complete the written survey that you may receive in the mail after you visit with us. Thank you!        [employer] Information     [employer] gives you  secure access to your electronic health record. If you see a primary care provider, you can also send messages to your care team and make appointments. If you have questions, please call your primary care clinic.  If you do not have a primary care provider, please call [phone] and they will assist you.        Care EveryWhere ID     This is your Care EveryWhere ID. This could be used by other organizations to access your [facility] medical records  [id number]        After Visit Summary       This is your record. Keep this with you and show to your community pharmacist(s) and doctor(s) at your next visit.

## 2017-06-18 NOTE — ED PROVIDER NOTES
History     Chief Complaint   Patient presents with     Epistaxis     started about 20 min ago     The history is provided by the patient. No  was used.     Joon Hernandez Sr is a 67 year old male who presents to ED via private car for evaluation of epistaxis. Onset of sx 20 minutes PTA. 3rd visit, recurrence in last week, 13th bleed in past month. States he was leaning over to put leash on dog when he developed bleed left nare. Pressure applied.  Has been using ky jelly prior to bedtime, added saline to bipap and has an appointment with ENT on 4 days. Denies fever, no cough or cold sx.  Has been  Taking b/p and states it has remained wnl.     I have reviewed the Medications, Allergies, Past Medical and Surgical History, and Social History in the Epic system.    Review of Systems   Constitutional: Negative for fever.   HENT: Positive for nosebleeds. Negative for congestion.    Eyes: Negative for redness.   Respiratory: Negative for shortness of breath.    Cardiovascular: Negative for chest pain.   Gastrointestinal: Negative for abdominal pain.   Genitourinary: Negative for difficulty urinating.   Musculoskeletal: Negative for arthralgias and neck stiffness.   Skin: Negative for color change.   Neurological: Negative for headaches.   Psychiatric/Behavioral: Negative for confusion. The patient is nervous/anxious.        Physical Exam   BP: 142/88  Heart Rate: 82  SpO2: 98 %  Physical Exam   Constitutional: He is oriented to person, place, and time. No distress.   HENT:   Nose: Mucosal edema present. Epistaxis is observed.   Active bleed left nostril   Eyes: Conjunctivae are normal.   Neck: Normal range of motion. Neck supple.   Cardiovascular: Normal rate and regular rhythm.    Pulmonary/Chest: Effort normal. No respiratory distress.   Abdominal: Soft. There is no tenderness.   Neurological: He is alert and oriented to person, place, and time.   Skin: He is not diaphoretic.   Nursing note and  vitals reviewed.      ED Course   Epistaxis tx  Date/Time: 6/18/2017 7:25 PM  Performed by: SUSAN DOBBINS  Authorized by: SUSAN DOBBINS   Consent given by: patient  Patient understanding: patient states understanding of the procedure being performed  Patient identity confirmed: verbally with patient  Treatment site: left Kiesselbach's area  Repair method: silver nitrate and anterior pack  Post-procedure assessment: bleeding stopped  Treatment complexity: simple  Patient tolerance: Patient tolerated the procedure well with no immediate complications              Labs Ordered and Resulted from Time of ED Arrival Up to the Time of Departure from the ED   CBC WITH PLATELETS DIFFERENTIAL - Abnormal; Notable for the following:        Result Value    RBC Count 4.27 (*)     Hemoglobin 13.1 (*)     Hematocrit 37.7 (*)     Platelet Count 149 (*)     All other components within normal limits   INR     Assessments & Plan (with Medical Decision Making)   Pt presents with recurrent epistaxis. VSS, exam as above. Epistaxis treatment per procedure note, failed silver nitrate,  4.5 cm rhino rocket inserted left nostril with complete resolution of bleed. Epic discharge instructions given. Pt discharged in stable condition.     I have reviewed the nursing notes.    I have reviewed the findings, diagnosis, plan and need for follow up with the patient.    New Prescriptions    No medications on file       Final diagnoses:   Epistaxis     See attached for home care  Tylenol for pain  Keep packing clean and intact until f/u  ENT or PCP f/u in 2 days for recheck and packing removal  Return to ED with worsening sx.     6/18/2017   [facility]     Susan Dobbins, SKIP FNP  06/18/17 1932

## 2017-06-18 NOTE — ED AVS SNAPSHOT
[facility]    [address] [address]    Phone:  [phone]                                       Joon Hernandez    MRN: [id number]    Department:  [facility]   Date of Visit:  6/18/2017           After Visit Summary Signature Page     I have received my discharge instructions, and my questions have been answered. I have discussed any challenges I see with this plan with the nurse or doctor.    ..........................................................................................................................................  Patient/Patient Representative Signature      ..........................................................................................................................................  Patient Representative Print Name and Relationship to Patient    ..................................................               ................................................  Date                                            Time    ..........................................................................................................................................  Reviewed by Signature/Title    ...................................................              ..............................................  Date                                                            Time

## 2017-06-18 NOTE — DISCHARGE INSTRUCTIONS
See attached for home care  Tylenol for pain  Keep packing clean and intact until f/u  ENT or PCP f/u in 2 days for recheck and packing removal  Return to ED with worsening sx.     Nosebleed (Adult)    Bleeding from the nose most commonly occurs because of injury or drying and cracking of the inner lining of the nose. Most nosebleeds are because of dry air or nose-picking. They can occur during a common cold or an allergy attack. They can also occur on a very hot day, or from dry air in the winter.  If the bleeding site is found, it may be cauterized. This means it is treated to cause a blood clot to form. This may be done with a chemical, heat, or electricity. If the bleeding continues after the site is cauterized, or if the site cannot be found, packing may be put in your nose. This is to apply pressure and stop the bleeding. The packing may be made of gauze or sponge. A small balloon catheter is sometimes used. These must be removed by your doctor. Some types of packing dissolve on their own.  Home care    If packing was put in your nose, unless told otherwise, do not pull on it or try to remove it yourself. You will be given an appointment to have it removed. You may also have been given antibiotics to prevent a sinus infection. If so, finish all of the medicine.    Do not blow your nose for 12 hours after the bleeding stops. This will allow a strong blood clot to form. Do not pick your nose. This may restart bleeding.    Avoid drinking alcohol and hot liquids for the next 2 days. Alcohol or hot liquids in your mouth can dilate blood vessels in your nose. This can cause bleeding to start again.    Do not take ibuprofen, naproxen, or medicines that contain aspirin. These thin the blood and may cause your nose to bleed. You may take acetaminophen for pain, unless another pain medicine was prescribed.    If the bleeding starts again, sit up and lean forward to prevent swallowing blood. Pinch your nose tightly  on both sides, as shown above, for 10 to 15 minutes. Time yourself. Don t release the pressure on your nose until 10 minutes is up. If bleeding does not stop, continue to pinch your nose and call your healthcare provider or return to this facility.    If you have a cold, allergies, or dry nasal membranes, lubricate the nasal passages. Apply a small amount of petroleum jelly inside the nose with a cotton swab twice a day (morning and night).    Avoid overheating your home. This can dry the air and make your condition worse.    Put a humidifier in the room where you sleep. This will add moisture to the air.    Use a saline nasal spray to keep nasal passages moist.    Do not pick your nose. Keep fingernails trimmed to decrease risk of bleeds.    Do not smoke.  Follow-up care  Follow up with your healthcare provider, or as advised. Nasal packing should be rechecked or removed within 2 to 3 days.  When to seek medical advice  Call your healthcare provider right away if any of these occur.    You have another nosebleed that you cannot control    Dizziness, weakness, or fainting    You become tired or confused    Fever of 100.4 F (38 C) or higher, or as directed by your healthcare provider    Headache    Sinus or facial pain    Shortness of breath or trouble breathing  Date Last Reviewed: 3/22/2015    1908-7875 [employer]. [address] [place] [address], [place] [address]. All rights reserved. This information is not intended as a substitute for professional medical care. Always follow your healthcare professional's instructions.

## 2017-06-19 ENCOUNTER — HOSPITAL ENCOUNTER (EMERGENCY)
Facility: HOSPITAL | Age: 67
Discharge: HOME | End: 2017-06-19
Admitting: EMERGENCY MEDICINE
Payer: COMMERCIAL

## 2017-06-19 VITALS
OXYGEN SATURATION: 96 % | SYSTOLIC BLOOD PRESSURE: 122 MMHG | RESPIRATION RATE: 18 BRPM | TEMPERATURE: 97.7 F | DIASTOLIC BLOOD PRESSURE: 78 MMHG | HEART RATE: 104 BPM

## 2017-06-19 DIAGNOSIS — R04.0 LEFT-SIDED NOSEBLEED: ICD-10-CM

## 2017-06-19 PROCEDURE — 99283 EMERGENCY DEPT VISIT LOW MDM: CPT | Performed by: EMERGENCY MEDICINE

## 2017-06-19 PROCEDURE — 99282 EMERGENCY DEPT VISIT SF MDM: CPT

## 2017-06-19 RX ORDER — HYDROCODONE BITARTRATE AND ACETAMINOPHEN 5; 325 MG/1; MG/1
1 TABLET ORAL EVERY 6 HOURS PRN
COMMUNITY
Start: 2017-06-18 | End: 2017-06-30

## 2017-06-19 ASSESSMENT — ENCOUNTER SYMPTOMS: RHINORRHEA: 1

## 2017-06-19 NOTE — ED AVS SNAPSHOT
[facility]    [address] [address]    Phone:  [phone]                                       Joon Hernandez    MRN: [id number]    Department[facility]  [facility]   Date of Visit:  6/19/2017           After Visit Summary Signature Page     I have received my discharge instructions, and my questions have been answered. I have discussed any challenges I see with this plan with the nurse or doctor.    ..........................................................................................................................................  Patient/Patient Representative Signature      ..........................................................................................................................................  Patient Representative Print Name and Relationship to Patient    ..................................................               ................................................  Date                                            Time    ..........................................................................................................................................  Reviewed by Signature/Title    ...................................................              ..............................................  Date                                                            Time

## 2017-06-19 NOTE — ED AVS SNAPSHOT
[facility]    [address] [address]    Phone:  [phone]                                       Joon Hernandez    MRN: [id number]    Department:  [facility]   Date of Visit:  6/19/2017           Patient Information     Date Of Birth          1950        Your diagnoses for this visit were:     Left-sided nosebleed       Follow-up Information     Follow up with ENT specialist In 1 day.    Why:  as arranged        Follow up with [facility].    Specialty:  EMERGENCY MEDICINE    Why:  As needed, If symptoms worsen    Contact information:    [address] [id number] [phone]    Additional information:    From [place]: Take [address]. Turn left at [address]. Turn left at the first stoplight on [address]. At the first stop sign, take a right onto [address]. Take a left into the parking lot and continue through until you reach the North enterance of the building.       From [place]: Take [address]. Take the [address] ramp towards [place]. Turn left onto [address]. Take a slight right onto [address]. Turn left at the first stoplight on [address]. At the first stop sign, take a right onto [address]. Take a left into the parking lot and continue through until you reach the North enterance of the building.       From [place]: Take [address]. Take a right at [address]. At the first stop sign, take a right onto [address]. Take a left into the parking lot and continue through until you reach the North enterance of the building.         Discharge Instructions       Additional air was admitted to the nasal balloon.    Return to emergency if you have problems with bleeding    Future Appointments        Provider Department Dept Phone Center    6/20/2017 10:15 AM Emily Seay PA-C [facility] [phone] Betty Ramires     4/24/2018 11:30 AM Annabella Avila RD [facility] [phone] [facility]         Review of your medicines      Our records show that you are taking the medicines listed below. If these are incorrect, please call your family doctor or clinic.        Dose / Directions Last dose taken    allopurinol 100 MG tablet   Commonly known as:  ZYLOPRIM   Dose:  100 mg        Take 100 mg by mouth daily.   Refills:  0        ASPIR-81 PO        Take  by mouth.   Refills:  0        BLOOD GLUCOSE TEST STRIPS Strp   Dose:  1 each        1 each   Refills:  0        cetirizine 10 MG tablet   Commonly known as:  zyrTEC   Dose:  10 mg        Take 10 mg by mouth daily as needed   Refills:  0        FISH OIL PO        1000 mg once a day   Refills:  0        GLUCOSAMINE CHONDROITIN JOINT PO   Dose:  1 tablet        Take 1 tablet by mouth 2 times daily   Refills:  0        HYDROcodone-acetaminophen 5-325 MG per tablet   Commonly known as:  NORCO   Dose:  1 tablet        Take 1 tablet by mouth every 6 hours as needed for moderate to severe pain   Refills:  0        indomethacin 25 MG capsule   Commonly known as:  INDOCIN   Dose:  25 mg        Take 25 mg by mouth 2 times daily as needed   Refills:  0        Lancets 30G Misc   Dose:  1 each        1 each   Refills:  0        LIPITOR PO   Dose:  80 mg        Take 80 mg by mouth daily   Refills:  0        lisinopril 30 MG tablet   Commonly known as:  PRINIVIL,ZESTRIL   Dose:  30 mg        Take 30 mg by mouth   Refills:  0        METFORMIN HCL ER PO        500 mg once a day   Refills:  0        MULTIVITAMIN PO   Dose:  1 tablet        Take 1 tablet by mouth daily   Refills:  0        VITAMIN D (CHOLECALCIFEROL) PO   Dose:  5000 Units        Take 5,000 Units by mouth daily   Refills:  0                Orders Needing Specimen Collection     None      Pending Results     No orders found from 6/17/2017 to 6/20/2017.            Pending Culture Results     No orders found from 6/17/2017 to  6/20/2017.            Thank you for choosing [facility]       Thank you for choosing [facility] for your care. Our goal is always to provide you with excellent care. Hearing back from our patients is one way we can continue to improve our services. Please take a few minutes to complete the written survey that you may receive in the mail after you visit with us. Thank you!        [employer] Information     [employer] gives you secure access to your electronic health record. If you see a primary care provider, you can also send messages to your care team and make appointments. If you have questions, please call your primary care clinic.  If you do not have a primary care provider, please call [phone] and they will assist you.        Care EveryWhere ID     This is your Care EveryWhere ID. This could be used by other organizations to access your [facility] medical records  [id number]        After Visit Summary       This is your record. Keep this with you and show to your community pharmacist(s) and doctor(s) at your next visit.

## 2017-06-20 ENCOUNTER — OFFICE VISIT (OUTPATIENT)
Dept: OTOLARYNGOLOGY | Facility: OTHER | Age: 67
End: 2017-06-20
Attending: PHYSICIAN ASSISTANT
Payer: COMMERCIAL

## 2017-06-20 VITALS
HEART RATE: 89 BPM | SYSTOLIC BLOOD PRESSURE: 132 MMHG | TEMPERATURE: 98.4 F | WEIGHT: 231 LBS | DIASTOLIC BLOOD PRESSURE: 66 MMHG | HEIGHT: 69 IN | BODY MASS INDEX: 34.21 KG/M2

## 2017-06-20 DIAGNOSIS — J34.2 DEVIATED NASAL SEPTUM: ICD-10-CM

## 2017-06-20 DIAGNOSIS — R04.0 EPISTAXIS: Primary | ICD-10-CM

## 2017-06-20 DIAGNOSIS — G47.33 OSA ON CPAP: ICD-10-CM

## 2017-06-20 PROCEDURE — 99214 OFFICE O/P EST MOD 30 MIN: CPT | Mod: 25

## 2017-06-20 PROCEDURE — 99214 OFFICE O/P EST MOD 30 MIN: CPT | Mod: 25 | Performed by: PHYSICIAN ASSISTANT

## 2017-06-20 PROCEDURE — 31238 NSL/SINS NDSC SRG NSL HEMRRG: CPT | Performed by: PHYSICIAN ASSISTANT

## 2017-06-20 PROCEDURE — 31231 NASAL ENDOSCOPY DX: CPT | Performed by: PHYSICIAN ASSISTANT

## 2017-06-20 PROCEDURE — 99203 OFFICE O/P NEW LOW 30 MIN: CPT

## 2017-06-20 RX ORDER — ECHINACEA PURPUREA EXTRACT 125 MG
2 TABLET ORAL
Qty: 2 BOTTLE | Refills: 11 | Status: SHIPPED | OUTPATIENT
Start: 2017-06-20

## 2017-06-20 RX ORDER — AMOXICILLIN 500 MG/1
500 CAPSULE ORAL 3 TIMES DAILY
Qty: 30 CAPSULE | Refills: 0 | Status: SHIPPED | OUTPATIENT
Start: 2017-06-20 | End: 2017-07-21

## 2017-06-20 RX ORDER — HYDROCODONE BITARTRATE AND ACETAMINOPHEN 7.5; 325 MG/1; MG/1
1 TABLET ORAL EVERY 4 HOURS PRN
Qty: 18 TABLET | Refills: 0 | Status: SHIPPED | OUTPATIENT
Start: 2017-06-20 | End: 2017-06-30

## 2017-06-20 ASSESSMENT — PAIN SCALES - GENERAL: PAINLEVEL: MILD PAIN (3)

## 2017-06-20 NOTE — MR AVS SNAPSHOT
After Visit Summary   6/20/2017    Joon Hernandez     MRN: [id number]           Patient Information     Date Of Birth          1950        Visit Information        Provider Department      6/20/2017 10:15 AM Emily Seay PA-C [facility]        Today's Diagnoses     Epistaxis    -  1    Deviated nasal septum          Care Instructions    Start nasal saline 2 sprays to each nostril 4- 5 times daily  Start Aquaphor to both nostrils 3-4 times a day      Follow nasal cares-   If bleeding occurs, present to ENT or ED if not controlled with pressure (15 minutes)  If there are concerns or questions, Call [phone] and ask for nurse    The patient has been treated today for epistaxis.  I spent the rest of the visit educating the patient on precautions to try and prevent re-bleeding.  This included strict restrictions on nose-blowing, manipulation, picking, and open-mouth sneezing.  Also, sleeping with the head elevated, and avoidance of strenuous activity, heavy lifting, and bending over were discussed.              Follow-ups after your visit        Follow-up notes from your care team     Return in about 7 years (around 6/20/2024).      Your next 10 appointments already scheduled     Apr 24, 2018 11:30 AM CDT   (Arrive by 11:15 AM)   Return Visit with Annabella Avila RD   [facility] ([facility] )    [address] [place] [id number] [phone]              Who to contact     If you have questions or need follow up information about today's clinic visit or your schedule please contact [facility] directly at [phone].  Normal or non-critical lab and imaging results will be communicated to you by MyChart, letter or phone within 4 business days after the clinic has received the results. If you do not hear from us within 7 days, please contact the clinic through MyChart or phone. If you have a critical or abnormal lab result, we will notify  "you by phone as soon as possible.  Submit refill requests through [employer] or call your pharmacy and they will forward the refill request to us. Please allow 3 business days for your refill to be completed.          Additional Information About Your Visit        [employer] Information     [employer] gives you secure access to your electronic health record. If you see a primary care provider, you can also send messages to your care team and make appointments. If you have questions, please call your primary care clinic.  If you do not have a primary care provider, please call [phone] and they will assist you.        Care EveryWhere ID     This is your Care EveryWhere ID. This could be used by other organizations to access your [facility] medical records  [id number]        Your Vitals Were     Pulse Temperature Height BMI (Body Mass Index)          89 98.4  F (36.9  C) (Tympanic) 5' 9\" (1.753 m) 34.11 kg/m2         Blood Pressure from Last 3 Encounters:   06/20/17 132/66   06/19/17 122/78   06/18/17 156/96    Weight from Last 3 Encounters:   06/20/17 231 lb (104.8 kg)   04/24/17 240 lb 6.4 oz (109 kg)   03/27/17 240 lb 11.2 oz (109.2 kg)              Today, you had the following     No orders found for display         Today's Medication Changes          These changes are accurate as of: 6/20/17 11:40 AM.  If you have any questions, ask your nurse or doctor.               Start taking these medicines.        Dose/Directions    amoxicillin 500 MG capsule   Commonly known as:  AMOXIL   Used for:  Epistaxis, Deviated nasal septum   Started by:  Emily Seay PA-C        Dose:  500 mg   Take 1 capsule (500 mg) by mouth 3 times daily   Quantity:  30 capsule   Refills:  0       AQUAPHOR ADVANCED THERAPY Oint   Used for:  Epistaxis, Deviated nasal septum   Started by:  Emily Seay PA-C        Externally apply topically 2 times daily as needed   Quantity:  85 g   Refills:  3       sodium chloride 0.65 % nasal spray   Commonly known " as:  OCEAN   Used for:  Epistaxis, Deviated nasal septum   Started by:  Emily Seay PA-C        Dose:  2 spray   Spray 2 sprays into both nostrils 5 times daily   Quantity:  2 Bottle   Refills:  11            Where to get your medicines      These medications were sent to [facility], [place] - [address]  [address] [address]     Phone:  [phone]     amoxicillin 500 MG capsule    AQUAPHOR ADVANCED THERAPY Oint    sodium chloride 0.65 % nasal spray                Primary Care Provider Office Phone # Fax #    Susan Osullivan [phone]       [facility] [address] [place] [id number]        Thank you!     Thank you for choosing [facility]  for your care. Our goal is always to provide you with excellent care. Hearing back from our patients is one way we can continue to improve our services. Please take a few minutes to complete the written survey that you may receive in the mail after your visit with us. Thank you!             Your Updated Medication List - Protect others around you: Learn how to safely use, store and throw away your medicines at www.disposemymeds.org.          This list is accurate as of: 6/20/17 11:40 AM.  Always use your most recent med list.                   Brand Name Dispense Instructions for use    allopurinol 100 MG tablet    ZYLOPRIM     Take 100 mg by mouth daily.       amoxicillin 500 MG capsule    AMOXIL    30 capsule    Take 1 capsule (500 mg) by mouth 3 times daily       AQUAPHOR ADVANCED THERAPY Oint     85 g    Externally apply topically 2 times daily as needed       ASPIR-81 PO      Take  by mouth.       BLOOD GLUCOSE TEST STRIPS Strp      1 each       cetirizine 10 MG tablet    zyrTEC     Take 10 mg by mouth daily as needed       FISH OIL PO      1000 mg once a day       GLUCOSAMINE CHONDROITIN JOINT PO      Take 1 tablet by mouth 2 times daily       HYDROcodone-acetaminophen 5-325 MG per  tablet    NORCO     Take 1 tablet by mouth every 6 hours as needed for moderate to severe pain       indomethacin 25 MG capsule    INDOCIN     Take 25 mg by mouth 2 times daily as needed       Lancets 30G Misc      1 each       LIPITOR PO      Take 80 mg by mouth daily       lisinopril 30 MG tablet    PRINIVIL,ZESTRIL     Take 30 mg by mouth       METFORMIN HCL ER PO      500 mg once a day       MULTIVITAMIN PO      Take 1 tablet by mouth daily       sodium chloride 0.65 % nasal spray    OCEAN    2 Bottle    Spray 2 sprays into both nostrils 5 times daily       VITAMIN D (CHOLECALCIFEROL) PO      Take 5,000 Units by mouth daily

## 2017-06-20 NOTE — ED NOTES
"Pt to room 7 with wife accompanying. Pt has been having intermittent bloody noses. Pt was seen yesterday in ED and had left nare cauterized and ultimately had a rhino rocket placed. Pt notes that he had \"like a runny nose\" today, having \"clear\" drainage and then around 2200 it was \"blood\". Pt notes that he had blood from both right and left nares. Rhino rocket to left nare remains in place, but pt states that it \"fells like there's less pressure\". No bleeding noted at this time. Pt also notes that he had a \"hot flash\" prior to arrival and feeling subsided shortly after it started.   "

## 2017-06-20 NOTE — NURSING NOTE
"Chief Complaint   Patient presents with     Epistaxis     Pt is here for left nosebleeds.  Pt was seen 6/18/17 at the ER and a balloon was placed.       Initial /66 (BP Location: Left arm, Cuff Size: Adult Large)  Pulse 89  Temp 98.4  F (36.9  C) (Tympanic)  Ht 5' 9\" (1.753 m)  Wt 231 lb (104.8 kg)  BMI 34.11 kg/m2 Estimated body mass index is 34.11 kg/(m^2) as calculated from the following:    Height as of this encounter: 5' 9\" (1.753 m).    Weight as of this encounter: 231 lb (104.8 kg).  Medication Reconciliation: complete   Bethanie Dickerson LPN      "

## 2017-06-20 NOTE — ED PROVIDER NOTES
History     Chief Complaint   Patient presents with     Epistaxis     Started about 2200 tonight, but wife reports it was oozing on and off all day.      HPI Comments: 23.01  Brought by his wife    Chief complaint  Left-sided nose bleed    History of present illness  67-year-old male started having a nosebleed, seen in emergency June 13, the bleeding had stopped but he return to emergency June 18 and cautery was attempted but bleeding ensued.  Rapid Rhino nasal balloon was inserted.  Today there was some right red bleeding around the balloon and some blood into the left eye.    Currently the bleeding slowed  Some discomfort with the balloon, he took norco 2 tablets before bed yesterday and today.  No fever  No cough no headache    The history is provided by the patient and medical records.     Past Medical History:   Diagnosis Date     Diabetic neuropathy (H)      Gout      HTN (hypertension)      Other and unspecified hyperlipidemia      Pain in joint, shoulder region      Type 2 diabetes mellitus (H)      Past Surgical History:   Procedure Laterality Date     ADENOIDECTOMY       COLONOSCOPY  6/9/2014    Procedure: COLONOSCOPY;  Surgeon: Pliar Rivera MD;  Location: [facility]     tonsillectomy          Allergies   Allergen Reactions     Simvastatin Cramps     No current facility-administered medications for this encounter.      Current Outpatient Prescriptions   Medication     HYDROcodone-acetaminophen (NORCO) 5-325 MG per tablet     Glucose Blood (BLOOD GLUCOSE TEST STRIPS) STRP     Lancets 30G MISC     lisinopril (PRINIVIL,ZESTRIL) 30 MG tablet     VITAMIN D, CHOLECALCIFEROL, PO     Omega-3 Fatty Acids (FISH OIL PO)     METFORMIN HCL ER PO     Glucos-Chondroit-Hyaluron-MSM (GLUCOSAMINE CHONDROITIN JOINT PO)     Multiple Vitamins-Minerals (MULTIVITAMIN PO)     Atorvastatin Calcium (LIPITOR PO)     allopurinol (ZYLOPRIM) 100 MG tablet     Aspirin (ASPIR-81 PO)     indomethacin (INDOCIN) 25 MG capsule      cetirizine (ZYRTEC) 10 MG tablet     Facility-Administered Medications Ordered in Other Encounters   Medication     lactated ringers infusion         Review of Systems   HENT: Positive for nosebleeds and rhinorrhea.    All other systems reviewed and are negative.      Physical Exam   BP: 122/78  Pulse: 104  Temp: 97.7  F (36.5  C)  Resp: 18  SpO2: 96 %  Physical Exam   Constitutional: He is oriented to person, place, and time. He appears well-developed and well-nourished. No distress.   Healthy-appearing  Stable vital signs with normal blood pressure  Alert and coherent  No difficulty speaking or breathing  Nasal balloon left nares  Small amount of blood stained drainage   HENT:   Right Ear: External ear normal.   Left Ear: External ear normal.   Mouth/Throat: Oropharynx is clear and moist.   The nasal balloon has barely any air in it, there is very little pressure in theballoon  No active bleeding currently   Cardiovascular: Normal rate and regular rhythm.    Pulmonary/Chest: Effort normal. No respiratory distress.   Neurological: He is alert and oriented to person, place, and time.   Skin: He is not diaphoretic.   Psychiatric: His behavior is normal.   Nursing note and vitals reviewed.      ED Course     ED Course     Procedures        Examination  Additional air added to the nasal balloon         Labs Ordered and Resulted from Time of ED Arrival Up to the Time of Departure from the ED - No data to display    Assessments & Plan (with Medical Decision Making)     I have reviewed the nursing notes.    I have reviewed the findings, diagnosis, plan and need for follow up with the patient.  67-year-old male with left-sided nosebleed, has ENT appointment tomorrow    New Prescriptions    No medications on file       Final diagnoses:   Left-sided nosebleed     Followup ENT tomorrow as arranged  Return to emergency if symptoms recur/worsen    6/19/2017   [facility]     Pedro Lusi Sawyer MD  06/19/17 3112

## 2017-06-20 NOTE — ED NOTES
"Pt's wife also noted that pt had some \"bloody\" drainage out of left eye earlier today. Pt and wife advised that this can happen when you have a bloody nose. No drainage noted at this time.  "

## 2017-06-20 NOTE — PATIENT INSTRUCTIONS
Start nasal saline 2 sprays to each nostril 4- 5 times daily  Start Aquaphor to both nostrils 3-4 times a day      Follow nasal cares-   If bleeding occurs, present to ENT or ED if not controlled with pressure (15 minutes)  If there are concerns or questions, Call [phone] and ask for nurse    The patient has been treated today for epistaxis.  I spent the rest of the visit educating the patient on precautions to try and prevent re-bleeding.  This included strict restrictions on nose-blowing, manipulation, picking, and open-mouth sneezing.  Also, sleeping with the head elevated, and avoidance of strenuous activity, heavy lifting, and bending over were discussed.

## 2017-06-20 NOTE — DISCHARGE INSTRUCTIONS
Additional air was admitted to the nasal balloon.    Return to emergency if you have problems with bleeding

## 2017-06-21 ENCOUNTER — TELEPHONE (OUTPATIENT)
Dept: OTOLARYNGOLOGY | Facility: OTHER | Age: 67
End: 2017-06-21

## 2017-06-21 NOTE — TELEPHONE ENCOUNTER
I called pt to see how he was doing today.  He states that some of his packing is coming.  He was told that is normal, but if he bleeds, he should be seen.  Pt has no other concerns.

## 2017-06-27 ENCOUNTER — OFFICE VISIT (OUTPATIENT)
Dept: OTOLARYNGOLOGY | Facility: OTHER | Age: 67
End: 2017-06-27
Attending: PHYSICIAN ASSISTANT
Payer: COMMERCIAL

## 2017-06-27 VITALS
TEMPERATURE: 97.5 F | SYSTOLIC BLOOD PRESSURE: 132 MMHG | HEART RATE: 64 BPM | HEIGHT: 69 IN | BODY MASS INDEX: 34.21 KG/M2 | DIASTOLIC BLOOD PRESSURE: 76 MMHG | WEIGHT: 231 LBS

## 2017-06-27 DIAGNOSIS — G47.33 OSA ON CPAP: ICD-10-CM

## 2017-06-27 DIAGNOSIS — J34.2 DEVIATED NASAL SEPTUM: ICD-10-CM

## 2017-06-27 DIAGNOSIS — R04.0 EPISTAXIS: Primary | ICD-10-CM

## 2017-06-27 PROCEDURE — 31231 NASAL ENDOSCOPY DX: CPT | Performed by: PHYSICIAN ASSISTANT

## 2017-06-27 PROCEDURE — 99213 OFFICE O/P EST LOW 20 MIN: CPT | Mod: 25 | Performed by: PHYSICIAN ASSISTANT

## 2017-06-27 PROCEDURE — 99212 OFFICE O/P EST SF 10 MIN: CPT | Mod: 25

## 2017-06-27 ASSESSMENT — PAIN SCALES - GENERAL: PAINLEVEL: NO PAIN (0)

## 2017-06-27 NOTE — MR AVS SNAPSHOT
After Visit Summary   6/27/2017    Joon Hernandez     MRN: [id number]           Patient Information     Date Of Birth          1950        Visit Information        Provider Department      6/27/2017 11:30 AM Emily Seay PA-C [facility]        Today's Diagnoses     Epistaxis    -  1    Deviated nasal septum        LULY on CPAP          Care Instructions    Humidifier CPAP mask- May consider alternative masks    Continue with nasal cares  Start nasal saline 2 sprays to each nostril 4- 5 times daily  Start Aquaphor to both nostrils 3-4 times a day        Follow nasal cares-   If bleeding occurs, present to ENT or ED if not controlled with pressure (15 minutes)    If there are concerns or questions, Call [phone] and ask for nurse          Follow-ups after your visit        Your next 10 appointments already scheduled     Apr 24, 2018 11:30 AM CDT   (Arrive by 11:15 AM)   Return Visit with Annabella Avila RD   [facility] ([facility] )    [address] [place] [id number] [phone]              Who to contact     If you have questions or need follow up information about today's clinic visit or your schedule please contact [facility] directly at [phone].  Normal or non-critical lab and imaging results will be communicated to you by MyChart, letter or phone within 4 business days after the clinic has received the results. If you do not hear from us within 7 days, please contact the clinic through [employer] or phone. If you have a critical or abnormal lab result, we will notify you by phone as soon as possible.  Submit refill requests through [employer] or call your pharmacy and they will forward the refill request to us. Please allow 3 business days for your refill to be completed.          Additional Information About Your Visit        [employer] Information     [employer] gives you secure access to your electronic health record. If you see a  "primary care provider, you can also send messages to your care team and make appointments. If you have questions, please call your primary care clinic.  If you do not have a primary care provider, please call [phone] and they will assist you.        Care EveryWhere ID     This is your Care EveryWhere ID. This could be used by other organizations to access your [place] medical records  [id number]        Your Vitals Were     Pulse Temperature Height BMI (Body Mass Index)          64 97.5  F (36.4  C) (Tympanic) 5' 9\" (1.753 m) 34.11 kg/m2         Blood Pressure from Last 3 Encounters:   06/27/17 132/76   06/20/17 132/66   06/19/17 122/78    Weight from Last 3 Encounters:   06/27/17 231 lb (104.8 kg)   06/20/17 231 lb (104.8 kg)   04/24/17 240 lb 6.4 oz (109 kg)              Today, you had the following     No orders found for display       Primary Care Provider Office Phone # Fax #    Susan Osullivan [phone]       [address] [address]        Equal Access to Services     [facility] AH: Hadii aad ku hadasho Soomaali, waaxda luqadaha, qaybta kaalmada adeegyada, chris lópez haysatyan pankaj mayo ah. So [facility] [phone].    ATENCIÓN: Si habla español, tiene a campuzano disposición servicios gratuitos de asistencia lingüística. Llame al [phone].    We comply with applicable federal civil rights laws and [place] laws. We do not discriminate on the basis of race, color, national origin, age, disability sex, sexual orientation or gender identity.            Thank you!     Thank you for choosing [facility]  for your care. Our goal is always to provide you with excellent care. Hearing back from our patients is one way we can continue to improve our services. Please take a few minutes to complete the written survey that you may receive in the mail after your visit with us. Thank you!             Your Updated Medication List - Protect others around " you: Learn how to safely use, store and throw away your medicines at www.disposemymeds.org.          This list is accurate as of: 6/27/17 11:31 AM.  Always use your most recent med list.                   Brand Name Dispense Instructions for use Diagnosis    allopurinol 100 MG tablet    ZYLOPRIM     Take 100 mg by mouth daily.        amoxicillin 500 MG capsule    AMOXIL    30 capsule    Take 1 capsule (500 mg) by mouth 3 times daily    Epistaxis, Deviated nasal septum       AQUAPHOR ADVANCED THERAPY Oint     85 g    Externally apply topically 2 times daily as needed    Epistaxis, Deviated nasal septum       ASPIR-81 PO      Take  by mouth.        BLOOD GLUCOSE TEST STRIPS Strp      1 each        cetirizine 10 MG tablet    zyrTEC     Take 10 mg by mouth daily as needed        FISH OIL PO      1000 mg once a day        GLUCOSAMINE CHONDROITIN JOINT PO      Take 1 tablet by mouth 2 times daily        * HYDROcodone-acetaminophen 5-325 MG per tablet    NORCO     Take 1 tablet by mouth every 6 hours as needed for moderate to severe pain        * HYDROcodone-acetaminophen 7.5-325 MG per tablet    NORCO    18 tablet    Take 1 tablet by mouth every 4 hours as needed for pain    Epistaxis, Deviated nasal septum       indomethacin 25 MG capsule    INDOCIN     Take 25 mg by mouth 2 times daily as needed        Lancets 30G Misc      1 each        LIPITOR PO      Take 80 mg by mouth daily        lisinopril 30 MG tablet    PRINIVIL,ZESTRIL     Take 30 mg by mouth        METFORMIN HCL ER PO      500 mg once a day        MULTIVITAMIN PO      Take 1 tablet by mouth daily        sodium chloride 0.65 % nasal spray    OCEAN    2 Bottle    Spray 2 sprays into both nostrils 5 times daily    Epistaxis, Deviated nasal septum       VITAMIN D (CHOLECALCIFEROL) PO      Take 5,000 Units by mouth daily        * Notice:  This list has 2 medication(s) that are the same as other medications prescribed for you. Read the directions carefully, and ask  your doctor or other care provider to review them with you.

## 2017-06-27 NOTE — PROGRESS NOTES
No chief complaint on file.    Joon presents for follow up. He was last seen on 6/20/17 with epistaxis. He had electro cautery with Nasopore packing on 6/20/17. He did well. However, patient did have vasovagal type reaction following procedure which turned to use of silver nitrate. He tolerated packing placement.   He has been using nasal saline and ointment. He has been tolerating PO Amoxicillin. He has been doing well. Reports no bleeding.       Patient does have HTN but currently under control.   No bleeding or clotting disorders.   He does have ASA 81 mg daily. He has been taking this daily, and no one informed him not to stop it.   No concerns with nasal trauma or injury.   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      No prior use of nasal cares. He does have CPAP and was using [place] Jelly. Recently started CPAP this winter. He does have humidifier on CPAP  Past Medical History:   Diagnosis Date     Diabetic neuropathy (H)      Gout      HTN (hypertension)      Other and unspecified hyperlipidemia      Pain in joint, shoulder region      Type 2 diabetes mellitus (H)         Allergies   Allergen Reactions     Simvastatin Cramps     Current Outpatient Prescriptions   Medication     sodium chloride (OCEAN) 0.65 % nasal spray     Emollient (AQUAPHOR ADVANCED THERAPY) OINT     amoxicillin (AMOXIL) 500 MG capsule     HYDROcodone-acetaminophen (NORCO) 7.5-325 MG per tablet     HYDROcodone-acetaminophen (NORCO) 5-325 MG per tablet     Glucose Blood (BLOOD GLUCOSE TEST STRIPS) STRP     Lancets 30G MISC     lisinopril (PRINIVIL,ZESTRIL) 30 MG tablet     VITAMIN D, CHOLECALCIFEROL, PO     Omega-3 Fatty Acids (FISH OIL PO)     METFORMIN HCL ER PO     Glucos-Chondroit-Hyaluron-MSM (GLUCOSAMINE CHONDROITIN JOINT PO)     Multiple Vitamins-Minerals (MULTIVITAMIN PO)     Atorvastatin Calcium (LIPITOR PO)     cetirizine (ZYRTEC) 10 MG tablet     allopurinol (ZYLOPRIM) 100 MG tablet     Aspirin (ASPIR-81 PO)      "indomethacin (INDOCIN) 25 MG capsule     No current facility-administered medications for this visit.      Facility-Administered Medications Ordered in Other Visits   Medication     lactated ringers infusion      ROS: 10 point ROS neg other than the symptoms noted above in the HPI.  /76 (Cuff Size: Adult Regular)  Pulse 64  Temp 97.5  F (36.4  C) (Tympanic)  Ht 5' 9\" (1.753 m)  Wt 231 lb (104.8 kg)  BMI 34.11 kg/m2    General - The patient is well nourished and well developed, and appears to have good nutritional status.  Alert and oriented to person and place, answers questions and cooperates with examination appropriately.   Head and Face - Normocephalic and atraumatic, with no gross asymmetry noted.  The facial nerve is intact, with strong symmetric movements.  Voice and Breathing - The patient was breathing comfortably without the use of accessory muscles. There was no wheezing, stridor, or stertor.  The patients voice was clear and strong, and had appropriate pitch and quality.  Ears -The external auditory canals are patent, the tympanic membranes are intact without effusion, retraction or mass.  Bony landmarks are intact.  Eyes - Extraocular movements intact, and the pupils were reactive to light.  Sclera were not icteric or injected, conjunctiva were pink and moist.  Mouth - Examination of the oral cavity showed pink, healthy oral mucosa. No lesions or ulcerations noted.  The tongue was mobile and midline, and the dentition were in good condition.    Throat - The walls of the oropharynx were smooth, pink, moist, symmetric, and had no lesions or ulcerations.  The tonsillar pillars and soft palate were symmetric.  The uvula was midline on elevation.    Neck - Normal midline excursion of the laryngotracheal complex during swallowing.  Full range of motion on passive movement.  Palpation of the occipital, submental, submandibular, internal jugular chain, and supraclavicular nodes did not demonstrate " any abnormal lymph nodes or masses.  Palpation of the thyroid was soft and smooth, with no nodules or goiter appreciated.  The trachea was mobile and midline.  Nose - External contour is symmetric, no gross deflection or scars. Scant Nasopore in left nares. No active bleeding.         Nasal endoscopy  To further evaluate the nasal cavity, I performed rigid nasal endoscopy.  I first sprayed the nasal cavity bilaterally with a mix of lidocaine and neosynephrine.  I then began on the right side using a 2.7mm, 30 degree rigid nasal endoscope.  The septum intact.  No abnormal secretions, purulence, or polyps were noted. The middle turbinate and middle meatus were clearly visualized and normal in appearance.  Looking up, the olfactory cleft was unobstructed.  Going further back, the sphenoethmoid recess was normal in appearance, with healthy appearing mucosa on the face of the sphenoid.  The nasopharynx was unremarkable, and the eustachian tube opening on this side was unobstructed.    LEFT Nares with residual Nasopore. This was not removed. No active bleeding.       ASSESSMENT:    ICD-10-CM    1. Epistaxis R04.0    2. Deviated nasal septum J34.2    3. LULY on CPAP G47.33     Z99.89      Continue with nasal cares  Remain with nasal saline, Aquaphor  Complete po Amoxil  Follow up in 2 weeks. If recurrent epistaxis, may need to see Dr. Montiel while I am out.       further bleeding, he may require suction cautery in OR, as patient tolerated only fair in clinic (vasovagal).  I am hopeful the site was cauterized to decrease flow/ frequency at this time. Informed patient he may still bleed, but hopefully improved.       The patient has been cauterized today for epistaxis.  I counseled them on keeping the head above the level of the heart at all times for the next week.  No picking or rubbing at the cauterized side of the nose, or blowing for 1 week.    The patient has been treated today for epistaxis.  I spent the rest of  the visit educating the patient on precautions to try and prevent re-bleeding.  This included strict restrictions on nose-blowing, manipulation, picking, and open-mouth sneezing.  Also, sleeping with the head elevated, and avoidance of strenuous activity, heavy lifting, and bending over were discussed.           Emily Seay PA-C  ENT  [facility], [place]  [phone]

## 2017-06-27 NOTE — NURSING NOTE
"Chief Complaint   Patient presents with     RECHECK     Follow Up Epistaxis; the patient has not had any bleeding since last visit       Initial /76 (Cuff Size: Adult Regular)  Pulse 64  Temp 97.5  F (36.4  C) (Tympanic)  Ht 5' 9\" (1.753 m)  Wt 231 lb (104.8 kg)  BMI 34.11 kg/m2 Estimated body mass index is 34.11 kg/(m^2) as calculated from the following:    Height as of this encounter: 5' 9\" (1.753 m).    Weight as of this encounter: 231 lb (104.8 kg).  Medication Reconciliation: complete   Marry Benito      "

## 2017-06-27 NOTE — PATIENT INSTRUCTIONS
Humidifier CPAP mask- May consider alternative masks    Continue with nasal cares  Start nasal saline 2 sprays to each nostril 4- 5 times daily  Start Aquaphor to both nostrils 3-4 times a day        Follow nasal cares-   If bleeding occurs, present to ENT or ED if not controlled with pressure (15 minutes)    If there are concerns or questions, Call [phone] and ask for nurse

## 2017-07-21 ENCOUNTER — OFFICE VISIT (OUTPATIENT)
Dept: OTOLARYNGOLOGY | Facility: OTHER | Age: 67
End: 2017-07-21
Attending: PHYSICIAN ASSISTANT
Payer: COMMERCIAL

## 2017-07-21 VITALS
DIASTOLIC BLOOD PRESSURE: 58 MMHG | WEIGHT: 239 LBS | TEMPERATURE: 97.3 F | HEIGHT: 69 IN | HEART RATE: 69 BPM | SYSTOLIC BLOOD PRESSURE: 102 MMHG | BODY MASS INDEX: 35.4 KG/M2

## 2017-07-21 DIAGNOSIS — G47.33 OSA ON CPAP: ICD-10-CM

## 2017-07-21 DIAGNOSIS — J34.2 DEVIATED NASAL SEPTUM: ICD-10-CM

## 2017-07-21 DIAGNOSIS — R04.0 EPISTAXIS: Primary | ICD-10-CM

## 2017-07-21 PROCEDURE — 99212 OFFICE O/P EST SF 10 MIN: CPT

## 2017-07-21 PROCEDURE — 99213 OFFICE O/P EST LOW 20 MIN: CPT | Performed by: PHYSICIAN ASSISTANT

## 2017-07-21 ASSESSMENT — PAIN SCALES - GENERAL: PAINLEVEL: NO PAIN (0)

## 2017-07-21 NOTE — PROGRESS NOTES
"Chief Complaint   Patient presents with     Epistaxis     Pt is here for a f/u nosebleeds. n Pt had nasopore in the left nare at the last visit.  No more bleeds noted.     Joon presents for follow up. He has been doing well.   He has no fredrick concerns. He is able to breath thru nares overall but remains plugged a little.   He has some packing remaining in his nares.   He has no concerns with facial pain, pressure  He has a cpap, and will contact his provider for possible new mask. His seal has not been working.         Past Medical History:   Diagnosis Date     Diabetic neuropathy (H)      Gout      HTN (hypertension)      Other and unspecified hyperlipidemia      Pain in joint, shoulder region      Type 2 diabetes mellitus (H)         Allergies   Allergen Reactions     Simvastatin Muscle Pain (Myalgia)     Current Outpatient Prescriptions   Medication     sodium chloride (OCEAN) 0.65 % nasal spray     Emollient (AQUAPHOR ADVANCED THERAPY) OINT     Glucose Blood (BLOOD GLUCOSE TEST STRIPS) STRP     Lancets 30G MISC     lisinopril (PRINIVIL,ZESTRIL) 30 MG tablet     VITAMIN D, CHOLECALCIFEROL, PO     Omega-3 Fatty Acids (FISH OIL PO)     METFORMIN HCL ER PO     Glucos-Chondroit-Hyaluron-MSM (GLUCOSAMINE CHONDROITIN JOINT PO)     Multiple Vitamins-Minerals (MULTIVITAMIN PO)     Atorvastatin Calcium (LIPITOR PO)     cetirizine (ZYRTEC) 10 MG tablet     allopurinol (ZYLOPRIM) 100 MG tablet     Aspirin (ASPIR-81 PO)     indomethacin (INDOCIN) 25 MG capsule     No current facility-administered medications for this visit.      Facility-Administered Medications Ordered in Other Visits   Medication     lactated ringers infusion      ROS: 10 point ROS neg other than the symptoms noted above in the HPI.  /58 (BP Location: Left arm, Cuff Size: Adult Large)  Pulse 69  Temp 97.3  F (36.3  C) (Tympanic)  Ht 5' 9\" (1.753 m)  Wt 239 lb (108.4 kg)  BMI 35.29 kg/m2    General - The patient is well nourished and well " developed, and appears to have good nutritional status.  Alert and oriented to person and place, answers questions and cooperates with examination appropriately.   Head and Face - Normocephalic and atraumatic, with no gross asymmetry noted.  The facial nerve is intact, with strong symmetric movements.  Voice and Breathing - The patient was breathing comfortably without the use of accessory muscles. There was no wheezing, stridor, or stertor.  The patients voice was clear and strong, and had appropriate pitch and quality.  Ears -The external auditory canals are patent, the tympanic membranes are intact without effusion, retraction or mass.  Bony landmarks are intact.  Eyes - Extraocular movements intact, and the pupils were reactive to light.  Sclera were not icteric or injected, conjunctiva were pink and moist.  Mouth - Examination of the oral cavity showed pink, healthy oral mucosa. No lesions or ulcerations noted.  The tongue was mobile and midline, and the dentition were in good condition.    Throat - The walls of the oropharynx were smooth, pink, moist, symmetric, and had no lesions or ulcerations.  The tonsillar pillars and soft palate were symmetric.  The uvula was midline on elevation.    Neck - Normal midline excursion of the laryngotracheal complex during swallowing.  Full range of motion on passive movement.  Palpation of the occipital, submental, submandibular, internal jugular chain, and supraclavicular nodes did not demonstrate any abnormal lymph nodes or masses.  Palpation of the thyroid was soft and smooth, with no nodules or goiter appreciated.  The trachea was mobile and midline.  Nose - External contour is symmetric, no gross deflection or scars. There is scant dried crusting along Left anterior septum and IT. No bleeding. No clots.   Gentle suctioning completed with #7. Tolerated well. No bleeding. DNS      ASSESSMENT:    ICD-10-CM    1. Epistaxis R04.0    2. Deviated nasal septum J34.2    3. LULY  on CPAP G47.33     Z99.89      Continue with nasal cares  Use nasal saline as directed  Follow up CPAP provider to check mask.     He will follow up PRN.   Consider septoplasty    Emily Seay PA-C  ENT  [facility] [place], [place]  [phone]

## 2017-07-21 NOTE — PATIENT INSTRUCTIONS
Continue with Nasal saline   Use 1-2 sprays to each nostril 3-4 times daily    Nose looks good  Follow up as needed    If there are concerns or questions, Call [phone] and ask for nurse

## 2017-07-21 NOTE — MR AVS SNAPSHOT
After Visit Summary   7/21/2017    Joon Hernandez     MRN: [id number]           Patient Information     Date Of Birth          1950        Visit Information        Provider Department      7/21/2017 2:45 PM Emily Seay PA-C [facility]        Today's Diagnoses     Epistaxis    -  1    Deviated nasal septum        LULY on CPAP          Care Instructions    Continue with Nasal saline   Use 1-2 sprays to each nostril 3-4 times daily    Nose looks good  Follow up as needed    If there are concerns or questions, Call [phone] and ask for nurse          Follow-ups after your visit        Follow-up notes from your care team     Return if symptoms worsen or fail to improve.      Your next 10 appointments already scheduled     Jul 21, 2017  2:45 PM CDT   (Arrive by 2:30 PM)   New Visit with Emily Seay PA-C   [facility] )    [address] [place] [address] [phone]            Apr 24, 2018 11:30 AM CDT   (Arrive by 11:15 AM)   Return Visit with Annabella Avila RD   [facility] ([facility] )    [address] [place] [id number] [phone]              Who to contact     If you have questions or need follow up information about today's clinic visit or your schedule please contact [facility] directly at [phone].  Normal or non-critical lab and imaging results will be communicated to you by MyChart, letter or phone within 4 business days after the clinic has received the results. If you do not hear from us within 7 days, please contact the clinic through MyChart or phone. If you have a critical or abnormal lab result, we will notify you by phone as soon as possible.  Submit refill requests through [employer] or call your pharmacy and they will forward the refill request to us. Please allow 3 business days for your refill to be completed.          Additional Information About Your Visit       "  [employer] Information     [employer] gives you secure access to your electronic health record. If you see a primary care provider, you can also send messages to your care team and make appointments. If you have questions, please call your primary care clinic.  If you do not have a primary care provider, please call [phone] and they will assist you.        Care EveryWhere ID     This is your Care EveryWhere ID. This could be used by other organizations to access your [place] medical records  [id number]        Your Vitals Were     Pulse Temperature Height BMI (Body Mass Index)          69 97.3  F (36.3  C) (Tympanic) 5' 9\" (1.753 m) 35.29 kg/m2         Blood Pressure from Last 3 Encounters:   07/21/17 102/58   06/27/17 132/76   06/20/17 132/66    Weight from Last 3 Encounters:   07/21/17 239 lb (108.4 kg)   06/27/17 231 lb (104.8 kg)   06/20/17 231 lb (104.8 kg)              Today, you had the following     No orders found for display       Primary Care Provider Office Phone # Fax #    Susan Osullivan [phone]       [place] [address]        Equal Access to Services     THUY HAINES : Hadii maurice ku hadasho Soomaali, waaxda luqadaha, qaybta kaalmada adeegyada, waxay candelariain haysatyan pankaj mayo . So [facility] [phone].    ATENCIÓN: Si habla español, tiene a campuzano disposición servicios gratuitos de asistencia lingüística. Llame al [phone].    We comply with applicable federal civil rights laws and [place] laws. We do not discriminate on the basis of race, color, national origin, age, disability sex, sexual orientation or gender identity.            Thank you!     Thank you for choosing [facility]  for your care. Our goal is always to provide you with excellent care. Hearing back from our patients is one way we can continue to improve our services. Please take a few minutes to complete the written survey that you may receive in the mail " after your visit with us. Thank you!             Your Updated Medication List - Protect others around you: Learn how to safely use, store and throw away your medicines at www.disposemymeds.org.          This list is accurate as of: 7/21/17  1:45 PM.  Always use your most recent med list.                   Brand Name Dispense Instructions for use Diagnosis    allopurinol 100 MG tablet    ZYLOPRIM     Take 100 mg by mouth daily.        AQUAPHOR ADVANCED THERAPY Oint     85 g    Externally apply topically 2 times daily as needed    Epistaxis, Deviated nasal septum       ASPIR-81 PO      Take  by mouth.        BLOOD GLUCOSE TEST STRIPS Strp      1 each        cetirizine 10 MG tablet    zyrTEC     Take 10 mg by mouth daily as needed        FISH OIL PO      1000 mg once a day        GLUCOSAMINE CHONDROITIN JOINT PO      Take 1 tablet by mouth 2 times daily        indomethacin 25 MG capsule    INDOCIN     Take 25 mg by mouth 2 times daily as needed        Lancets 30G Misc      1 each        LIPITOR PO      Take 80 mg by mouth daily        lisinopril 30 MG tablet    PRINIVIL,ZESTRIL     Take 30 mg by mouth        METFORMIN HCL ER PO      500 mg once a day        MULTIVITAMIN PO      Take 1 tablet by mouth daily        sodium chloride 0.65 % nasal spray    OCEAN    2 Bottle    Spray 2 sprays into both nostrils 5 times daily    Epistaxis, Deviated nasal septum       VITAMIN D (CHOLECALCIFEROL) PO      Take 5,000 Units by mouth daily

## 2017-07-21 NOTE — NURSING NOTE
"Chief Complaint   Patient presents with     Epistaxis     Pt is here for a f/u nosebleeds. n Pt had nasopore in the left nare at the last visit.  No more bleeds noted.       Initial /58 (BP Location: Left arm, Cuff Size: Adult Large)  Pulse 69  Temp 97.3  F (36.3  C) (Tympanic)  Ht 5' 9\" (1.753 m)  Wt 239 lb (108.4 kg)  BMI 35.29 kg/m2 Estimated body mass index is 35.29 kg/(m^2) as calculated from the following:    Height as of this encounter: 5' 9\" (1.753 m).    Weight as of this encounter: 239 lb (108.4 kg).  Medication Reconciliation: complete   Bethanie Dickerson LPN      "

## 2017-10-17 ENCOUNTER — DOCUMENTATION ONLY (OUTPATIENT)
Dept: OTHER | Facility: CLINIC | Age: 67
End: 2017-10-17

## 2017-10-17 DIAGNOSIS — Z71.89 ACP (ADVANCE CARE PLANNING): Chronic | ICD-10-CM

## 2019-05-31 ENCOUNTER — TELEPHONE (OUTPATIENT)
Dept: NUCLEAR MEDICINE | Facility: HOSPITAL | Age: 69
End: 2019-05-31

## 2019-05-31 NOTE — TELEPHONE ENCOUNTER
A reminder call was performed on 5/31/19 for the patients nuclear medicine stress test on 6/3/19. Patient was given the time, date, location and prep for the exam. Patient understood.

## 2019-06-03 ENCOUNTER — HOSPITAL ENCOUNTER (OUTPATIENT)
Dept: NUCLEAR MEDICINE | Facility: HOSPITAL | Age: 69
Setting detail: NUCLEAR MEDICINE
End: 2019-06-03
Attending: FAMILY MEDICINE
Payer: COMMERCIAL

## 2019-06-03 ENCOUNTER — HOSPITAL ENCOUNTER (OUTPATIENT)
Dept: NUCLEAR MEDICINE | Facility: HOSPITAL | Age: 69
Setting detail: NUCLEAR MEDICINE
Discharge: HOME | End: 2019-06-03
Attending: FAMILY MEDICINE | Admitting: FAMILY MEDICINE
Payer: COMMERCIAL

## 2019-06-03 ENCOUNTER — HOSPITAL ENCOUNTER (OUTPATIENT)
Dept: CARDIOLOGY | Facility: HOSPITAL | Age: 69
Setting detail: NUCLEAR MEDICINE
End: 2019-06-03
Attending: FAMILY MEDICINE
Payer: COMMERCIAL

## 2019-06-03 DIAGNOSIS — R06.02 SOB (SHORTNESS OF BREATH): ICD-10-CM

## 2019-06-03 DIAGNOSIS — I63.81 LACUNAR STROKE (H): ICD-10-CM

## 2019-06-03 DIAGNOSIS — I10 ESSENTIAL HYPERTENSION: ICD-10-CM

## 2019-06-03 DIAGNOSIS — E11.9 CONTROLLED TYPE 2 DIABETES MELLITUS WITHOUT COMPLICATION, WITHOUT LONG-TERM CURRENT USE OF INSULIN (H): ICD-10-CM

## 2019-06-03 PROCEDURE — 93016 CV STRESS TEST SUPVJ ONLY: CPT | Performed by: INTERNAL MEDICINE

## 2019-06-03 PROCEDURE — A9500 TC99M SESTAMIBI: HCPCS | Performed by: RADIOLOGY

## 2019-06-03 PROCEDURE — 78452 HT MUSCLE IMAGE SPECT MULT: CPT | Mod: TC

## 2019-06-03 PROCEDURE — 93018 CV STRESS TEST I&R ONLY: CPT | Performed by: INTERNAL MEDICINE

## 2019-06-03 PROCEDURE — 34300033 ZZH RX 343: Performed by: RADIOLOGY

## 2019-06-03 PROCEDURE — 93017 CV STRESS TEST TRACING ONLY: CPT | Performed by: INTERNAL MEDICINE

## 2019-06-03 PROCEDURE — 25800030 ZZH RX IP 258 OP 636: Performed by: INTERNAL MEDICINE

## 2019-06-03 RX ORDER — SODIUM CHLORIDE 9 MG/ML
INJECTION, SOLUTION INTRAVENOUS ONCE
Status: COMPLETED | OUTPATIENT
Start: 2019-06-03 | End: 2019-06-03

## 2019-06-03 RX ADMIN — Medication 30 MILLICURIE: at 09:01

## 2019-06-03 RX ADMIN — Medication 10 MILLICURIE: at 07:00

## 2019-06-03 RX ADMIN — SODIUM CHLORIDE: 9 INJECTION, SOLUTION INTRAVENOUS at 08:53

## 2019-08-12 ENCOUNTER — HOSPITAL ENCOUNTER (OUTPATIENT)
Dept: RESPIRATORY THERAPY | Facility: HOSPITAL | Age: 69
Discharge: HOME | End: 2019-08-12
Attending: INTERNAL MEDICINE | Admitting: INTERNAL MEDICINE
Payer: COMMERCIAL

## 2019-08-12 PROCEDURE — 94060 EVALUATION OF WHEEZING: CPT | Mod: 26 | Performed by: INTERNAL MEDICINE

## 2019-08-12 PROCEDURE — 94060 EVALUATION OF WHEEZING: CPT

## 2019-08-12 PROCEDURE — 25000125 ZZHC RX 250: Performed by: INTERNAL MEDICINE

## 2019-08-12 RX ORDER — ALBUTEROL SULFATE 0.83 MG/ML
2.5 SOLUTION RESPIRATORY (INHALATION)
Status: COMPLETED | OUTPATIENT
Start: 2019-08-12 | End: 2019-08-12

## 2019-08-12 RX ADMIN — ALBUTEROL SULFATE 2.5 MG: 2.5 SOLUTION RESPIRATORY (INHALATION) at 14:47

## 2020-03-02 ENCOUNTER — HEALTH MAINTENANCE LETTER (OUTPATIENT)
Age: 70
End: 2020-03-02

## 2020-07-15 ENCOUNTER — PREP FOR PROCEDURE (OUTPATIENT)
Dept: SURGERY | Facility: OTHER | Age: 70
End: 2020-07-15

## 2020-07-15 ENCOUNTER — TELEPHONE (OUTPATIENT)
Dept: SURGERY | Facility: OTHER | Age: 70
End: 2020-07-15

## 2020-07-15 DIAGNOSIS — K63.5 COLORECTAL POLYP DETECTED ON COLONOSCOPY: Primary | ICD-10-CM

## 2020-07-15 DIAGNOSIS — Z01.818 PRE-OP TESTING: Primary | ICD-10-CM

## 2020-07-15 PROBLEM — I63.81 LACUNAR STROKE (H): Status: ACTIVE | Noted: 2017-04-17

## 2020-07-15 PROBLEM — R94.39 POSITIVE CARDIAC STRESS TEST: Status: ACTIVE | Noted: 2019-07-24

## 2020-07-15 NOTE — ED NOTES
Clamp remains on nose, no further bleeding.  
Instructions to pt and wife.  Home with starter pack of lortab and packing left in place sheet.  Instructed on follow up in 2 days, sooner if worse, no further bleeding.  
Pt bent over to hook dog up and nose started bleeding.  Bleeding from left nostril.  Numerous nose bleeds in past 2 weeks.  To room and pt blew out large clot.  Pressure applied  
oral

## 2020-07-24 ENCOUNTER — ANESTHESIA EVENT (OUTPATIENT)
Dept: SURGERY | Facility: HOSPITAL | Age: 70
End: 2020-07-24
Payer: COMMERCIAL

## 2020-07-24 ASSESSMENT — LIFESTYLE VARIABLES: TOBACCO_USE: 1

## 2020-07-24 NOTE — ANESTHESIA PREPROCEDURE EVALUATION
Anesthesia Pre-Procedure Evaluation    Patient: Joon Hernandez    MRN: [id number] : 1950          Preoperative Diagnosis: Colorectal polyp detected on colonoscopy [K63.5]    Procedure(s):  COLONOSCOPY    Past Medical History:   Diagnosis Date     Diabetic neuropathy (H)      Gout      HTN (hypertension)      Other and unspecified hyperlipidemia      Pain in joint, shoulder region      Type 2 diabetes mellitus (H)      Past Surgical History:   Procedure Laterality Date     ADENOIDECTOMY       COLONOSCOPY  2014    Procedure: COLONOSCOPY;  Surgeon: Pilar Rivera MD;  Location: [facility]     tonsillectomy         Anesthesia Evaluation     . Pt has had prior anesthetic.     No history of anesthetic complications          ROS/MED HX    ENT/Pulmonary:     (+)sleep apnea, tobacco use, Past use uses CPAP , . .    Neurologic:     (+)neuropathy - Diabetic, CVA date:  without deficits    Cardiovascular:     (+) Dyslipidemia, hypertension-range: not on beta blocker, ---. : . . . :. . Previous cardiac testing date:results:Stress Testdate:6/3/19 results:Review of the electrocardiogram shows no acute ST-T changes. There  were no arrhythmias.     ASSESSMENT: Exercise Cardiolite study limited by fatigue. Objectively  adequate double product no arrhythmias and no ischemic changes noted  on the EKG. The Cardiolite scans are pending.ECG reviewed date: results:Nsr, inferior infarct age undetermined date: results:          METS/Exercise Tolerance:  3 - Able to walk 1-2 blocks without stopping   Hematologic:  - neg hematologic  ROS       Musculoskeletal:   (+) arthritis,  other musculoskeletal- Gout      GI/Hepatic:     (+) bowel prep,       Renal/Genitourinary:  - ROS Renal section negative       Endo:     (+) type II DM Diabetic complications: neuropathy, Obesity, .      Psychiatric:  - neg psychiatric ROS       Infectious Disease:   (+) Other Infectious Disease gout      Malignancy:      - no malignancy   Other:  "   - neg other ROS                      Physical Exam  Normal systems: cardiovascular, pulmonary and dental    Airway   Mallampati: I  TM distance: >3 FB  Neck ROM: full    Dental     Cardiovascular   Rhythm and rate: regular and normal      Pulmonary    breath sounds clear to auscultation            Lab Results   Component Value Date    WBC 7.0 06/18/2017    HGB 13.1 (L) 06/18/2017    HCT 37.7 (L) 06/18/2017     (L) 06/18/2017    CRP 5.5 01/04/2017     01/04/2017    POTASSIUM 3.7 01/04/2017    CHLORIDE 109 01/04/2017    CO2 23 01/04/2017    BUN 19 01/04/2017    CR 0.75 01/04/2017     (H) 01/04/2017    ROBERTH 9.0 01/04/2017    ALBUMIN 3.4 01/04/2017    PROTTOTAL 7.5 01/04/2017    ALT 25 01/04/2017    AST 14 01/04/2017    ALKPHOS 160 (H) 01/04/2017    BILITOTAL 0.5 01/04/2017    INR 1.03 06/18/2017       Preop Vitals  BP Readings from Last 3 Encounters:   07/21/17 102/58   06/27/17 132/76   06/20/17 132/66    Pulse Readings from Last 3 Encounters:   07/21/17 69   06/27/17 64   06/20/17 89      Resp Readings from Last 3 Encounters:   06/19/17 18   06/15/17 12   06/13/17 18    SpO2 Readings from Last 3 Encounters:   06/19/17 96%   06/18/17 96%   06/15/17 92%      Temp Readings from Last 1 Encounters:   07/21/17 97.3  F (36.3  C) (Tympanic)    Ht Readings from Last 1 Encounters:   07/21/17 1.753 m (5' 9\")      Wt Readings from Last 1 Encounters:   07/21/17 108.4 kg (239 lb)    Estimated body mass index is 35.29 kg/m  as calculated from the following:    Height as of 7/21/17: 1.753 m (5' 9\").    Weight as of 7/21/17: 108.4 kg (239 lb).       Anesthesia Plan      History & Physical Review  History and physical reviewed and following examination; no interval change.    ASA Status:  3 .    NPO Status:  > 8 hours    Plan for MAC Maintenance will be TIVA.  Reason for MAC:  Chronic cardiopulmonary disease (G9), Deep or markedly invasive procedure (G8) and Extreme anxiety (QS)           Postoperative " Care      Consents  Anesthetic plan, risks, benefits and alternatives discussed with:  Patient and Spouse..                 SKIP Lozano CRNA

## 2020-07-27 ENCOUNTER — OFFICE VISIT (OUTPATIENT)
Dept: FAMILY MEDICINE | Facility: OTHER | Age: 70
End: 2020-07-27
Payer: COMMERCIAL

## 2020-07-27 DIAGNOSIS — Z01.818 PRE-OP TESTING: ICD-10-CM

## 2020-07-27 PROCEDURE — U0003 INFECTIOUS AGENT DETECTION BY NUCLEIC ACID (DNA OR RNA); SEVERE ACUTE RESPIRATORY SYNDROME CORONAVIRUS 2 (SARS-COV-2) (CORONAVIRUS DISEASE [COVID-19]), AMPLIFIED PROBE TECHNIQUE, MAKING USE OF HIGH THROUGHPUT TECHNOLOGIES AS DESCRIBED BY CMS-2020-01-R: HCPCS | Mod: ZL | Performed by: SURGERY

## 2020-07-28 LAB
SARS-COV-2 RNA SPEC QL NAA+PROBE: NOT DETECTED
SPECIMEN SOURCE: NORMAL

## 2020-07-30 ENCOUNTER — ANESTHESIA (OUTPATIENT)
Dept: SURGERY | Facility: HOSPITAL | Age: 70
End: 2020-07-30
Payer: COMMERCIAL

## 2020-07-30 ENCOUNTER — HOSPITAL ENCOUNTER (OUTPATIENT)
Facility: HOSPITAL | Age: 70
Discharge: HOME | End: 2020-07-30
Attending: SURGERY | Admitting: SURGERY
Payer: COMMERCIAL

## 2020-07-30 VITALS
OXYGEN SATURATION: 95 % | SYSTOLIC BLOOD PRESSURE: 135 MMHG | DIASTOLIC BLOOD PRESSURE: 83 MMHG | TEMPERATURE: 98.1 F | BODY MASS INDEX: 35.84 KG/M2 | HEART RATE: 76 BPM | RESPIRATION RATE: 16 BRPM | HEIGHT: 69 IN | WEIGHT: 242 LBS

## 2020-07-30 DIAGNOSIS — K63.5 COLORECTAL POLYP DETECTED ON COLONOSCOPY: ICD-10-CM

## 2020-07-30 PROCEDURE — 88305 TISSUE EXAM BY PATHOLOGIST: CPT | Mod: TC | Performed by: SURGERY

## 2020-07-30 PROCEDURE — 25000128 H RX IP 250 OP 636: Performed by: NURSE ANESTHETIST, CERTIFIED REGISTERED

## 2020-07-30 PROCEDURE — 71000027 ZZH RECOVERY PHASE 2 EACH 15 MINS: Performed by: SURGERY

## 2020-07-30 PROCEDURE — 45385 COLONOSCOPY W/LESION REMOVAL: CPT | Performed by: NURSE ANESTHETIST, CERTIFIED REGISTERED

## 2020-07-30 PROCEDURE — 45380 COLONOSCOPY AND BIOPSY: CPT | Performed by: SURGERY

## 2020-07-30 PROCEDURE — 37000009 ZZH ANESTHESIA TECHNICAL FEE, EACH ADDTL 15 MIN: Performed by: SURGERY

## 2020-07-30 PROCEDURE — 27210794 ZZH OR GENERAL SUPPLY STERILE: Performed by: SURGERY

## 2020-07-30 PROCEDURE — 37000008 ZZH ANESTHESIA TECHNICAL FEE, 1ST 30 MIN: Performed by: SURGERY

## 2020-07-30 PROCEDURE — 36000050 ZZH SURGERY LEVEL 2 1ST 30 MIN: Performed by: SURGERY

## 2020-07-30 PROCEDURE — 25800030 ZZH RX IP 258 OP 636: Performed by: NURSE ANESTHETIST, CERTIFIED REGISTERED

## 2020-07-30 PROCEDURE — 40000305 ZZH STATISTIC PRE PROC ASSESS I: Performed by: SURGERY

## 2020-07-30 RX ORDER — PROPOFOL 10 MG/ML
INJECTION, EMULSION INTRAVENOUS PRN
Status: DISCONTINUED | OUTPATIENT
Start: 2020-07-30 | End: 2020-07-30

## 2020-07-30 RX ORDER — ALBUTEROL SULFATE 0.83 MG/ML
2.5 SOLUTION RESPIRATORY (INHALATION) EVERY 4 HOURS PRN
Status: DISCONTINUED | OUTPATIENT
Start: 2020-07-30 | End: 2020-07-30 | Stop reason: HOSPADM

## 2020-07-30 RX ORDER — ONDANSETRON 4 MG/1
4 TABLET, ORALLY DISINTEGRATING ORAL EVERY 30 MIN PRN
Status: DISCONTINUED | OUTPATIENT
Start: 2020-07-30 | End: 2020-07-30 | Stop reason: HOSPADM

## 2020-07-30 RX ORDER — SODIUM CHLORIDE, SODIUM LACTATE, POTASSIUM CHLORIDE, CALCIUM CHLORIDE 600; 310; 30; 20 MG/100ML; MG/100ML; MG/100ML; MG/100ML
INJECTION, SOLUTION INTRAVENOUS CONTINUOUS
Status: DISCONTINUED | OUTPATIENT
Start: 2020-07-30 | End: 2020-07-30 | Stop reason: HOSPADM

## 2020-07-30 RX ORDER — FENTANYL CITRATE 50 UG/ML
25-50 INJECTION, SOLUTION INTRAMUSCULAR; INTRAVENOUS
Status: DISCONTINUED | OUTPATIENT
Start: 2020-07-30 | End: 2020-07-30 | Stop reason: HOSPADM

## 2020-07-30 RX ORDER — NALOXONE HYDROCHLORIDE 0.4 MG/ML
.1-.4 INJECTION, SOLUTION INTRAMUSCULAR; INTRAVENOUS; SUBCUTANEOUS
Status: DISCONTINUED | OUTPATIENT
Start: 2020-07-30 | End: 2020-07-30 | Stop reason: HOSPADM

## 2020-07-30 RX ORDER — HYDROMORPHONE HYDROCHLORIDE 1 MG/ML
.3-.5 INJECTION, SOLUTION INTRAMUSCULAR; INTRAVENOUS; SUBCUTANEOUS EVERY 10 MIN PRN
Status: DISCONTINUED | OUTPATIENT
Start: 2020-07-30 | End: 2020-07-30 | Stop reason: HOSPADM

## 2020-07-30 RX ORDER — MEPERIDINE HYDROCHLORIDE 25 MG/ML
12.5 INJECTION INTRAMUSCULAR; INTRAVENOUS; SUBCUTANEOUS
Status: DISCONTINUED | OUTPATIENT
Start: 2020-07-30 | End: 2020-07-30 | Stop reason: HOSPADM

## 2020-07-30 RX ORDER — HYDRALAZINE HYDROCHLORIDE 20 MG/ML
2.5-5 INJECTION INTRAMUSCULAR; INTRAVENOUS EVERY 10 MIN PRN
Status: DISCONTINUED | OUTPATIENT
Start: 2020-07-30 | End: 2020-07-30 | Stop reason: HOSPADM

## 2020-07-30 RX ORDER — ONDANSETRON 2 MG/ML
4 INJECTION INTRAMUSCULAR; INTRAVENOUS EVERY 30 MIN PRN
Status: DISCONTINUED | OUTPATIENT
Start: 2020-07-30 | End: 2020-07-30 | Stop reason: HOSPADM

## 2020-07-30 RX ADMIN — PROPOFOL 20 MG: 10 INJECTION, EMULSION INTRAVENOUS at 10:01

## 2020-07-30 RX ADMIN — SODIUM CHLORIDE, POTASSIUM CHLORIDE, SODIUM LACTATE AND CALCIUM CHLORIDE: 600; 310; 30; 20 INJECTION, SOLUTION INTRAVENOUS at 09:29

## 2020-07-30 RX ADMIN — PROPOFOL 20 MG: 10 INJECTION, EMULSION INTRAVENOUS at 10:05

## 2020-07-30 RX ADMIN — PROPOFOL 20 MG: 10 INJECTION, EMULSION INTRAVENOUS at 10:07

## 2020-07-30 RX ADMIN — PROPOFOL 20 MG: 10 INJECTION, EMULSION INTRAVENOUS at 10:03

## 2020-07-30 RX ADMIN — PROPOFOL 10 MG: 10 INJECTION, EMULSION INTRAVENOUS at 10:09

## 2020-07-30 RX ADMIN — PROPOFOL 50 MG: 10 INJECTION, EMULSION INTRAVENOUS at 09:57

## 2020-07-30 RX ADMIN — PROPOFOL 10 MG: 10 INJECTION, EMULSION INTRAVENOUS at 10:11

## 2020-07-30 RX ADMIN — PROPOFOL 50 MG: 10 INJECTION, EMULSION INTRAVENOUS at 09:59

## 2020-07-30 ASSESSMENT — MIFFLIN-ST. JEOR: SCORE: 1848.08

## 2020-07-30 NOTE — DISCHARGE INSTRUCTIONS

## 2020-07-30 NOTE — ANESTHESIA CARE TRANSFER NOTE
Patient: Joon Hernandez Sr    Procedure(s):  COLONOSCOPY with polypectomy    Diagnosis: Colorectal polyp detected on colonoscopy [K63.5]  Diagnosis Additional Information: No value filed.    Anesthesia Type:   MAC     Note:  Airway :Nasal Cannula  Patient transferred to:Phase II  Handoff Report: Identifed the Patient, Identified the Reponsible Provider, Reviewed the pertinent medical history, Discussed the surgical course, Reviewed Intra-OP anesthesia mangement and issues during anesthesia, Set expectations for post-procedure period and Allowed opportunity for questions and acknowledgement of understanding      Vitals: (Last set prior to Anesthesia Care Transfer)    CRNA VITALS  7/30/2020 0943 - 7/30/2020 1038      7/30/2020             Pulse:  77    Ht Rate:  76    SpO2:  98 %    Resp Rate (set):  8                Electronically Signed By: SKIP Chinchilla CRNA  July 30, 2020  10:38 AM

## 2020-07-30 NOTE — ANESTHESIA POSTPROCEDURE EVALUATION
Patient: Joon Hernandez Sr    Procedure(s):  COLONOSCOPY with polypectomy    Diagnosis:Colorectal polyp detected on colonoscopy [K63.5]  Diagnosis Additional Information: No value filed.    Anesthesia Type:  MAC    Note:  Anesthesia Post Evaluation    Patient location during evaluation: Bedside  Patient participation: Able to fully participate in evaluation  Level of consciousness: awake and alert  Pain management: adequate  Airway patency: patent  Cardiovascular status: acceptable and stable  Respiratory status: acceptable and room air  Hydration status: acceptable  PONV: none     Anesthetic complications: None          Last vitals:  Vitals:    07/30/20 1020 07/30/20 1025 07/30/20 1030   BP: 92/70 110/70 122/81   Pulse: 81 76 80   Resp:      Temp:      SpO2: 95% 93% 95%         Electronically Signed By: SKIP Chinchilla CRNA  July 30, 2020  10:42 AM

## 2020-07-30 NOTE — OP NOTE
REPORT OF OPERATION  DATE OF PROCEDURE: 7/30/2020    PATIENT: Joon Hernandez Sr    SURGERY PREFORMED:   Colonoscopy     with biopsy    without use of cauterized snare    without tattooing    PREOPERATIVE DIAGNOSIS: History of Colon Polyps    POSTOPERATIVE DIAGNOSIS:    Same   Colon polyps, 80 cm   Diverticulosis was identified.   Hemorrhoids  were  identified.    SURGEON: Hector Joseph MD    ASSISTANTS: None    ANESTHESIA: Monitored Anesthesia Care    COMPLICATIONS: None apparent    TRANSFUSIONS: None     TISSUE TO PATHOLOGY: Polyps from 80 cm were sent to pathology for pathological diagnosis.    FINDINGS: Colon polyps, 80 cm.  Diverticulosis was identified.  Hemorrhoids  were  identified.    INDICATIONS: This is a 70 year old male in need of a colonoscopy for History of Colon Polyps.  The patient will be taken to the endoscopy suite for that procedure.    DESCRIPTIONS OF PROCEDURE IN DETAIL: After consent was obtained the patient was taken to the endoscopy suite and placed in the left lateral decubitus position.  The patient was identified and the correct patient was confirmed.  Monitored Anesthesia Care was given.  A time out was preformed verifying the correct patient and the correct procedure.  The entire operative team was in agreement.  All necessary equipment and supplies were in the room.    Rectal exam was preformed and no lesions of the anal canal were noted.  The colonoscope was inserted into the anus and passed without difficulty to the cecum.  The cecum was identified by the ileocecal valve, the coalescence of the tinea and the appendiceal orifice.  Upon withdrawal all walls of the colon were visualized.  Polyps were identified at 80 cm.  These was removed by cold biopsy forceps.  Tattooing their of the location was not done.  Large colon masses and colitis were not seen.colon  Diverticulosis was seen.  Upon reaching the rectum the scope was retroflexed and internal hemorrhoids  were  seen.  The  scope was straightened back out and removed from the patient.  The patient was then taken to the recovery room in stable condition tolerating the procedure well.      Prep: good    Withdrawal time was 9 minutes.    It is recommended that the patient have another colonoscopy in 5 years.

## 2020-07-31 LAB — COPATH REPORT: NORMAL

## 2020-12-20 ENCOUNTER — HEALTH MAINTENANCE LETTER (OUTPATIENT)
Age: 70
End: 2020-12-20

## 2021-04-18 ENCOUNTER — HEALTH MAINTENANCE LETTER (OUTPATIENT)
Age: 71
End: 2021-04-18

## 2021-08-08 ENCOUNTER — HEALTH MAINTENANCE LETTER (OUTPATIENT)
Age: 71
End: 2021-08-08

## 2021-10-03 ENCOUNTER — HEALTH MAINTENANCE LETTER (OUTPATIENT)
Age: 71
End: 2021-10-03

## 2021-11-22 NOTE — NURSING NOTE
Patient ID checked with name and date of birth. Reviewed allergies and home medications. Took Vitals and brief history.   Printed out the compliance download from his machine discussed the chin strap. And the Josephine view but he did not want the full face and his leak really not bad so he may not even need the chin strap.    room air

## 2022-03-19 ENCOUNTER — HEALTH MAINTENANCE LETTER (OUTPATIENT)
Age: 72
End: 2022-03-19

## 2022-05-14 ENCOUNTER — HEALTH MAINTENANCE LETTER (OUTPATIENT)
Age: 72
End: 2022-05-14

## 2022-06-10 ENCOUNTER — HOSPITAL ENCOUNTER (EMERGENCY)
Facility: HOSPITAL | Age: 72
Discharge: HOME | End: 2022-06-10
Attending: NURSE PRACTITIONER | Admitting: NURSE PRACTITIONER
Payer: COMMERCIAL

## 2022-06-10 VITALS
DIASTOLIC BLOOD PRESSURE: 79 MMHG | RESPIRATION RATE: 18 BRPM | TEMPERATURE: 97.9 F | SYSTOLIC BLOOD PRESSURE: 151 MMHG | OXYGEN SATURATION: 96 % | HEART RATE: 81 BPM
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
## 2022-06-10 PROCEDURE — 99213 OFFICE O/P EST LOW 20 MIN: CPT | Performed by: NURSE PRACTITIONER

## 2022-06-10 PROCEDURE — G0463 HOSPITAL OUTPT CLINIC VISIT: HCPCS

## 2022-06-10 RX ORDER — DOXYCYCLINE HYCLATE 100 MG
200 TABLET ORAL ONCE
Qty: 2 TABLET | Refills: 0 | Status: SHIPPED | OUTPATIENT
Start: 2022-06-10 | End: 2022-06-10

## 2022-06-10 ASSESSMENT — ENCOUNTER SYMPTOMS
SHORTNESS OF BREATH: 0
ACTIVITY CHANGE: 1
MYALGIAS: 0
RHINORRHEA: 1
COLOR CHANGE: 1
NAUSEA: 0
CHILLS: 0
FEVER: 0
HEADACHES: 1
VOMITING: 0
DIARRHEA: 0
ABDOMINAL PAIN: 0
APPETITE CHANGE: 0
FATIGUE: 0

## 2022-06-10 NOTE — ED PROVIDER NOTES
History     Chief Complaint   Patient presents with     Insect Bite     On stomach, unsure if tick or insect bite.      HPI  Joon Hernandez Sr is a 72 year old male who presents with a insect bite next to his bellybutton that has been present for 1 week.  Accompanied with runny nose and a headache.  Did not see what type of bug may have bit him.  No OTC medications have been taken or home interventions applied.  Had second COVID vaccine booster 3 months ago.  History of hypertension and type 2 diabetes.  Denies fevers, chills, nausea, vomiting, diarrhea, and shortness of breath.    Allergies:  Allergies   Allergen Reactions     Simvastatin Muscle Pain (Myalgia)       Problem List:    Patient Active Problem List    Diagnosis Date Noted     Colorectal polyp detected on colonoscopy 07/15/2020     Priority: Medium     Added automatically from request for surgery [id number]       Positive cardiac stress test 07/24/2019     Priority: Medium     Added automatically from request for surgery [id number]       Lacunar stroke (H) 04/17/2017     Priority: Medium     ACP (advance care planning) 01/30/2017     Priority: Medium     Advance Care Planning 1/30/2017: ACP Review of Chart / Resources Provided:  Reviewed chart for advance care plan.  Joon Hernandez Sr has no plan or code status on file. Discussed available resources and provided with information. Confirmed code status reflects current choices pending further ACP discussions.  Confirmed/documented legally designated decision makers.  Added by Sydney Amaya       Sleep apnea 01/12/2017     Priority: Medium     Ischemic stroke (H) 01/05/2017     Priority: Medium     Controlled type 2 diabetes mellitus without complication (H) 11/10/2016     Priority: Medium     Morbid obesity (H) 03/22/2016     Priority: Medium     Osteoarthritis of hip 03/22/2016     Priority: Medium     Essential hypertension 06/18/2010     Priority: Medium     Overview:   IMO Update       Arthralgia of  "shoulder 2010     Priority: Medium     Overview:   IMO Update 10/11       Hyperlipidemia 2008     Priority: Medium     Overview:   IMO Update 10/11       Gout 2006     Priority: Medium        Past Medical History:    Past Medical History:   Diagnosis Date     Diabetic neuropathy (H)      Gout      HTN (hypertension)      Other and unspecified hyperlipidemia      Pain in joint, shoulder region      Type 2 diabetes mellitus (H)        Past Surgical History:    Past Surgical History:   Procedure Laterality Date     ADENOIDECTOMY       COLONOSCOPY  2014    Procedure: COLONOSCOPY;  Surgeon: Pilar Rivera MD;  Location: [facility]     COLONOSCOPY N/A 2020    Procedure: COLONOSCOPY with polypectomy;  Surgeon: Hector Joseph MD;  Location: [facility]     tonsillectomy         Family History:    Family History   Problem Relation Age of Onset     Prostate Cancer Father      Cancer Paternal Grandmother         lung cancer     Cancer Maternal Grandfather         leukemia     Family History Negative Other         diabetes     C.A.D. Maternal Grandmother      Diabetes Mother        Social History:  Marital Status:   [2]  Social History     Tobacco Use     Smoking status: Former Smoker     Packs/day: 0.50     Years: 15.00     Pack years: 7.50     Types: Cigars     Quit date: 1985     Years since quittin.2     Smokeless tobacco: Never Used   Substance Use Topics     Alcohol use: No     Comment: \"rarely\"     Drug use: No        Medications:    allopurinol (ZYLOPRIM) 100 MG tablet  Aspirin (ASPIR-81 PO)  Atorvastatin Calcium (LIPITOR PO)  cetirizine (ZYRTEC) 10 MG tablet  doxycycline hyclate (VIBRA-TABS) 100 MG tablet  Emollient (AQUAPHOR ADVANCED THERAPY) OINT  Glucos-Chondroit-Hyaluron-MSM (GLUCOSAMINE CHONDROITIN JOINT PO)  Glucose Blood (BLOOD GLUCOSE TEST STRIPS) STRP  indomethacin (INDOCIN) 25 MG capsule  Lancets 30G MISC  lisinopril (PRINIVIL,ZESTRIL) 30 MG tablet  METFORMIN HCL ER " PO  Multiple Vitamins-Minerals (MULTIVITAMIN PO)  Omega-3 Fatty Acids (FISH OIL PO)  sodium chloride (OCEAN) 0.65 % nasal spray  VITAMIN D, CHOLECALCIFEROL, PO          Review of Systems   Constitutional: Positive for activity change. Negative for appetite change, chills, fatigue and fever (warm in house).   HENT: Positive for rhinorrhea.    Respiratory: Negative for shortness of breath (not worse than normal).    Gastrointestinal: Negative for abdominal pain, diarrhea, nausea and vomiting.   Genitourinary: Negative.    Musculoskeletal: Negative for myalgias.        Joint pain   Skin: Positive for color change (right side of abdomen).   Neurological: Positive for headaches.       Physical Exam   BP: 179/86  Pulse: 81  Temp: 97.9  F (36.6  C)  Resp: 18  SpO2: 96 %      Physical Exam  Vitals and nursing note reviewed.   HENT:      Head: Normocephalic.   Cardiovascular:      Rate and Rhythm: Normal rate and regular rhythm.      Heart sounds: Normal heart sounds. No murmur heard.  Pulmonary:      Effort: Pulmonary effort is normal. No respiratory distress.      Breath sounds: Normal breath sounds. No wheezing or rales.   Abdominal:      General: Abdomen is protuberant.      Tenderness: There is no abdominal tenderness.       Musculoskeletal:         General: No swelling or tenderness.   Skin:     General: Skin is warm and dry.      Capillary Refill: Capillary refill takes less than 2 seconds.      Findings: Bruising and erythema present.   Neurological:      Mental Status: He is alert and oriented to person, place, and time.   Psychiatric:         Behavior: Behavior normal.         ED Course                 Procedures             No results found for this or any previous visit (from the past 24 hour(s)).    Medications - No data to display    Assessments & Plan (with Medical Decision Making)     I have reviewed the nursing notes.    I have reviewed the findings, diagnosis, plan and need for follow up with the  patient.  (W57.XXXA) Bug bite, initial encounter  Comment: 72 year old male who presents with a insect bite next to his tahira that has been present for 1 week.  Accompanied with runny nose and a headache.  Did not see what type of bug may have bit him.  No OTC medications have been taken or home interventions applied.  Had second COVID vaccine booster 3 months ago.  History of hypertension and type 2 diabetes.  Denies fevers, chills, nausea, vomiting, diarrhea, and shortness of breath.    MDM:NHT. Lungs CTA    Plan: Doxycycline 200 mg one-time dose prescribed given the prevalence of Lyme's disease in this region. Education provided and/or discussed for this/these medication and insect bite  Benadryl for itching.  acetaminophen for discomfort.  Do not take milk products two hours before or after taking doxycycline.  Do not take multivitamins and avoid the sun when taking doxycycline.  Return to ER for signs of infection, including fever, increased redness, purulent drainage from wound, and increased pain, or difficulty breathing  These discharge instructions and medications were reviewed with him and understanding verbalized.    This document was prepared using a combination of typing and voice generated software.  While every attempt was made for accuracy, spelling and grammatical errors may exist.    Discharge Medication List as of 6/10/2022  4:27 PM      START taking these medications    Details   doxycycline hyclate (VIBRA-TABS) 100 MG tablet Take 2 tablets (200 mg) by mouth once for 1 dose, Disp-2 tablet, R-0, E-Prescribe             Final diagnoses:   Bug bite, initial encounter       6/10/2022   [facility]       Elvira Young, NICK  06/10/22 2058

## 2022-06-10 NOTE — ED TRIAGE NOTES
Pt presents with a reddened bump beside his umbilical area. Pt states that it has been there for 1 week.

## 2022-06-10 NOTE — DISCHARGE INSTRUCTIONS
Benadryl for itching.  acetaminophen for discomfort.  Do not take milk products two hours before or after taking doxycycline.  Do not take multivitamins and avoid the sun when taking doxycycline.  Return to ER for signs of infection, including fever, increased redness, purulent drainage from wound, and increased pain, or difficulty breathing

## 2022-09-04 ENCOUNTER — HEALTH MAINTENANCE LETTER (OUTPATIENT)
Age: 72
End: 2022-09-04

## 2023-01-10 NOTE — ED NOTES
Pt to CT.   [employer] PAP Renewal Approval     FOR DOCUMENTATION ONLY:  Financial Assistance for [employer] is approved from 1/7/23 to 12/31/23  Source: [employer] Patient Assistance Program  Phone: [phone]  Fax: [phone]   Pharmacy: [facility]     Patient notified via patient portal.

## 2023-01-15 ENCOUNTER — HEALTH MAINTENANCE LETTER (OUTPATIENT)
Age: 73
End: 2023-01-15

## 2023-06-03 ENCOUNTER — HEALTH MAINTENANCE LETTER (OUTPATIENT)
Age: 73
End: 2023-06-03

## 2023-07-23 ENCOUNTER — HEALTH MAINTENANCE LETTER (OUTPATIENT)
Age: 73
End: 2023-07-23

## 2024-02-15 ENCOUNTER — TRANSFERRED RECORDS (OUTPATIENT)
Dept: HEALTH INFORMATION MANAGEMENT | Facility: CLINIC | Age: 74
End: 2024-02-15

## 2024-02-15 LAB — RETINOPATHY: NEGATIVE

## 2024-02-18 ENCOUNTER — HEALTH MAINTENANCE LETTER (OUTPATIENT)
Age: 74
End: 2024-02-18

## 2024-07-06 ENCOUNTER — HEALTH MAINTENANCE LETTER (OUTPATIENT)
Age: 74
End: 2024-07-06

## 2024-09-14 ENCOUNTER — HEALTH MAINTENANCE LETTER (OUTPATIENT)
Age: 74
End: 2024-09-14

## 2024-11-11 ENCOUNTER — HOSPITAL ENCOUNTER (EMERGENCY)
Facility: HOSPITAL | Age: 74
Discharge: HOME | End: 2024-11-11
Attending: NURSE PRACTITIONER
Payer: COMMERCIAL

## 2024-11-11 VITALS
SYSTOLIC BLOOD PRESSURE: 193 MMHG | TEMPERATURE: 97.6 F | OXYGEN SATURATION: 96 % | BODY MASS INDEX: 34.8 KG/M2 | HEART RATE: 90 BPM | WEIGHT: 235 LBS | DIASTOLIC BLOOD PRESSURE: 88 MMHG | RESPIRATION RATE: 18 BRPM | HEIGHT: 69 IN

## 2024-11-11 DIAGNOSIS — S09.92XA NOSE INJURY, INITIAL ENCOUNTER: Primary | ICD-10-CM

## 2024-11-11 DIAGNOSIS — R04.0 EPISTAXIS: ICD-10-CM

## 2024-11-11 PROCEDURE — 99213 OFFICE O/P EST LOW 20 MIN: CPT | Performed by: NURSE PRACTITIONER

## 2024-11-11 PROCEDURE — 250N000009 HC RX 250: Performed by: NURSE PRACTITIONER

## 2024-11-11 PROCEDURE — G0463 HOSPITAL OUTPT CLINIC VISIT: HCPCS

## 2024-11-11 RX ORDER — OXYMETAZOLINE HYDROCHLORIDE 0.05 G/100ML
2 SPRAY NASAL ONCE
Status: COMPLETED | OUTPATIENT
Start: 2024-11-11 | End: 2024-11-11

## 2024-11-11 RX ADMIN — OXYMETAZOLINE HYDROCHLORIDE 2 SPRAY: 0.05 SOLUTION NASAL at 14:13

## 2024-11-11 ASSESSMENT — ACTIVITIES OF DAILY LIVING (ADL): ADLS_ACUITY_SCORE: 0

## 2024-11-11 NOTE — ED PROVIDER NOTES
History     Chief Complaint   Patient presents with    Epistaxis     HPI  Joon Hernandez Sr is a 74 year old male who presents to urgent care for evaluation of nosebleed.  Earlier this afternoon patient was cutting down a tree when it fell and it hit him on his nose.  Patient started nosebleeding right away.  On arrival to this facility he has 2 Kleenex's and both of his nostrils.  These were removed with slight oozing of blood that was noted.  A nose clamp was applied while patient sat in the waiting room to be roomed.  Once he was roomed it was noted that he had remote nose clamp and holding Kleenex to his nose.  No significant active nosebleeding appreciated at this time.      He denies any loss of consciousness.  He denies any significant pain or swelling to his nose.  He can still breathe out of his nose.  No headaches or dizziness.  No blood thinner use other than baby aspirin.    Reports history of nosebleeds and has had his nose cauterized in the past.      Allergies:  Allergies   Allergen Reactions    Simvastatin Muscle Pain (Myalgia)       Problem List:    Patient Active Problem List    Diagnosis Date Noted    Colorectal polyp detected on colonoscopy 07/15/2020     Priority: Medium     Added automatically from request for surgery [id number]      Positive cardiac stress test 07/24/2019     Priority: Medium     Added automatically from request for surgery [id number]      Lacunar stroke (H) 04/17/2017     Priority: Medium    ACP (advance care planning) 01/30/2017     Priority: Medium     Advance Care Planning 1/30/2017: ACP Review of Chart / Resources Provided:  Reviewed chart for advance care plan.  Joon Hernandez Sr has no plan or code status on file. Discussed available resources and provided with information. Confirmed code status reflects current choices pending further ACP discussions.  Confirmed/documented legally designated decision makers.  Added by Sydney Amaya      Sleep apnea 01/12/2017      "Priority: Medium    Ischemic stroke (H) 2017     Priority: Medium    Controlled type 2 diabetes mellitus without complication (H) 11/10/2016     Priority: Medium    Morbid obesity (H) 2016     Priority: Medium    Osteoarthritis of hip 2016     Priority: Medium    Essential hypertension 2010     Priority: Medium     Overview:   IMO Update      Arthralgia of shoulder 2010     Priority: Medium     Overview:   IMO Update 10/11      Hyperlipidemia 2008     Priority: Medium     Overview:   IMO Update 10/11      Gout 2006     Priority: Medium        Past Medical History:    Past Medical History:   Diagnosis Date    Diabetic neuropathy (H)     Gout     HTN (hypertension)     Other and unspecified hyperlipidemia     Pain in joint, shoulder region     Type 2 diabetes mellitus (H)        Past Surgical History:    Past Surgical History:   Procedure Laterality Date    ADENOIDECTOMY      COLONOSCOPY  2014    Procedure: COLONOSCOPY;  Surgeon: Pilar Rivera MD;  Location: [facility]    COLONOSCOPY N/A 2020    Procedure: COLONOSCOPY with polypectomy;  Surgeon: Hector Joseph MD;  Location: [facility] OR    tonsillectomy         Family History:    Family History   Problem Relation Age of Onset    Prostate Cancer Father     Cancer Paternal Grandmother         lung cancer    Cancer Maternal Grandfather         leukemia    Family History Negative Other         diabetes    C.A.D. Maternal Grandmother     Diabetes Mother        Social History:  Marital Status:   [2]  Social History     Tobacco Use    Smoking status: Former     Current packs/day: 0.00     Average packs/day: 0.5 packs/day for 15.0 years (7.5 ttl pk-yrs)     Types: Cigars, Cigarettes     Start date: 1970     Quit date: 1985     Years since quittin.6    Smokeless tobacco: Never   Substance Use Topics    Alcohol use: No     Comment: \"rarely\"    Drug use: No        Medications:    allopurinol (ZYLOPRIM) 100 " "MG tablet  Aspirin (ASPIR-81 PO)  Atorvastatin Calcium (LIPITOR PO)  cetirizine (ZYRTEC) 10 MG tablet  Glucose Blood (BLOOD GLUCOSE TEST STRIPS) STRP  Lancets 30G MISC  lisinopril (PRINIVIL,ZESTRIL) 30 MG tablet  METFORMIN HCL ER PO  Multiple Vitamins-Minerals (MULTIVITAMIN PO)  Omega-3 Fatty Acids (FISH OIL PO)  VITAMIN D, CHOLECALCIFEROL, PO  Emollient (AQUAPHOR ADVANCED THERAPY) OINT  Glucos-Chondroit-Hyaluron-MSM (GLUCOSAMINE CHONDROITIN JOINT PO)  indomethacin (INDOCIN) 25 MG capsule  sodium chloride (OCEAN) 0.65 % nasal spray          Review of Systems   HENT:  Positive for nosebleeds.    All other systems reviewed and are negative.      Physical Exam   BP: (!) 193/88  Pulse: 90  Temp: 97.6  F (36.4  C)  Resp: 18  Height: 175.3 cm (5' 9\")  Weight: 106.6 kg (235 lb)  SpO2: 96 %      Physical Exam  Vitals and nursing note reviewed.   Constitutional:       Appearance: Normal appearance. He is not ill-appearing or toxic-appearing.   HENT:      Nose: No congestion.      Right Nostril: No septal hematoma or occlusion.      Left Nostril: No septal hematoma or occlusion.      Comments: No significant active nosebleeding noted once clamp was removed.  No swelling to the nose.  No tenderness to palpation to his nose.  No other signs of injury to his face.     Mouth/Throat:      Mouth: Mucous membranes are moist.   Eyes:      Extraocular Movements: Extraocular movements intact.      Pupils: Pupils are equal, round, and reactive to light.   Cardiovascular:      Rate and Rhythm: Normal rate and regular rhythm.      Heart sounds: Normal heart sounds.   Pulmonary:      Effort: Pulmonary effort is normal. No respiratory distress.      Breath sounds: Normal breath sounds. No wheezing or rhonchi.   Musculoskeletal:      Cervical back: Neck supple.   Skin:     General: Skin is warm and dry.      Coloration: Skin is not pale.   Neurological:      Mental Status: He is alert and oriented to person, place, and time.         ED " Course        Procedures  Patient asked to blow his nose using Kleenex with still feel clots noted to come out.  Afrin was then sprayed to both nostrils and clamp placed.  Will continue monitoring.            No results found for this or any previous visit (from the past 24 hours).    Medications   oxymetazoline (AFRIN) 0.05 % spray 2 spray (2 sprays Both Nostrils $Given 11/11/24 2234)       Assessments & Plan (with Medical Decision Making)   74-year-old male that presented for evaluation of nosebleeding that started after a tree branch fell and hit him on his nose.  Bleeding was mostly controlled upon arrival to this facility.  He has no focal tenderness to palpation to his nose or any other facial bones.  Notes that he is breathing well through his nose.  Low concern for nasal fracture which patient is in agreement with.  We did use Afrin to both of his nostrils and patient was observed for 20 to 30 minutes with no active nosebleeds appreciated.    Patient instructed on controlling nosebleeds at home but if unable to control nosebleed then he should return to urgent care/ER for evaluation. He will follow-up with his primary doctor as needed.    I have reviewed the nursing notes.    I have reviewed the findings, diagnosis, plan and need for follow up with the patient.  This document was prepared using a combination of typing and voice generated software.  While every attempt was made for accuracy, spelling and grammatical errors may exist.         New Prescriptions    No medications on file       Final diagnoses:   Nose injury, initial encounter   Epistaxis       11/11/2024   [facility]       Mpofu, Prudence, CNP  11/11/24 1500

## 2024-11-11 NOTE — DISCHARGE INSTRUCTIONS
Take it easy for the rest of the day.  If you start nosebleeding again as discussed you will need to blow your nose really hard and then use the nasal spray before clamping for a good 20 to 30 minutes.  If you are still unable to stop the bleeding please return to urgent care/ER for evaluation.    Follow-up with your doctor as needed.

## 2024-11-11 NOTE — ED TRIAGE NOTES
Reports about 30 minutes PTA was cleaning up a tree that fell an a branch hit his nose and was bleeding.  Pt is on 81mg ASA daily.  Has the same paper towel in nose when he left home to here.     LOI Escalera CNP assessed patient in triage and determined patient Urgent Care appropriate. Will be seen in Urgent Care.

## 2024-11-11 NOTE — ED TRIAGE NOTES
Pt presents with nose bleed since 1250pm today. Pt was cutting a tree and was hit in the nose causing bleeding. Pt on Asprin 81mg daily.

## 2025-03-30 ENCOUNTER — HEALTH MAINTENANCE LETTER (OUTPATIENT)
Age: 75
End: 2025-03-30

## 2025-04-20 ENCOUNTER — HEALTH MAINTENANCE LETTER (OUTPATIENT)
Age: 75
End: 2025-04-20

## 2025-07-25 ENCOUNTER — APPOINTMENT (OUTPATIENT)
Dept: GENERAL RADIOLOGY | Facility: HOSPITAL | Age: 75
End: 2025-07-25
Attending: STUDENT IN AN ORGANIZED HEALTH CARE EDUCATION/TRAINING PROGRAM
Payer: COMMERCIAL

## 2025-07-25 ENCOUNTER — APPOINTMENT (OUTPATIENT)
Dept: CT IMAGING | Facility: HOSPITAL | Age: 75
End: 2025-07-25
Attending: STUDENT IN AN ORGANIZED HEALTH CARE EDUCATION/TRAINING PROGRAM
Payer: COMMERCIAL

## 2025-07-25 ENCOUNTER — HOSPITAL ENCOUNTER (EMERGENCY)
Facility: HOSPITAL | Age: 75
Discharge: HOME | End: 2025-07-26
Attending: STUDENT IN AN ORGANIZED HEALTH CARE EDUCATION/TRAINING PROGRAM
Payer: COMMERCIAL

## 2025-07-25 DIAGNOSIS — R55 SYNCOPE, UNSPECIFIED SYNCOPE TYPE: Primary | ICD-10-CM

## 2025-07-25 LAB
ANION GAP SERPL CALCULATED.3IONS-SCNC: 11 MMOL/L (ref 7–15)
BASOPHILS # BLD AUTO: 0 10E3/UL (ref 0–0.2)
BASOPHILS NFR BLD AUTO: 0 %
BUN SERPL-MCNC: 22.1 MG/DL (ref 8–23)
CALCIUM SERPL-MCNC: 10.3 MG/DL (ref 8.8–10.4)
CHLORIDE SERPL-SCNC: 106 MMOL/L (ref 98–107)
CREAT SERPL-MCNC: 0.81 MG/DL (ref 0.67–1.17)
EGFRCR SERPLBLD CKD-EPI 2021: >90 ML/MIN/1.73M2
EOSINOPHIL # BLD AUTO: 0.1 10E3/UL (ref 0–0.7)
EOSINOPHIL NFR BLD AUTO: 2 %
ERYTHROCYTE [DISTWIDTH] IN BLOOD BY AUTOMATED COUNT: 12.4 % (ref 10–15)
GLUCOSE SERPL-MCNC: 182 MG/DL (ref 70–99)
HCO3 SERPL-SCNC: 24 MMOL/L (ref 22–29)
HCT VFR BLD AUTO: 32.6 % (ref 40–53)
HGB BLD-MCNC: 11.4 G/DL (ref 13.3–17.7)
HOLD SPECIMEN: NORMAL
IMM GRANULOCYTES # BLD: 0 10E3/UL
IMM GRANULOCYTES NFR BLD: 0 %
LYMPHOCYTES # BLD AUTO: 1.8 10E3/UL (ref 0.8–5.3)
LYMPHOCYTES NFR BLD AUTO: 28 %
MAGNESIUM SERPL-MCNC: 1.5 MG/DL (ref 1.7–2.3)
MCH RBC QN AUTO: 31.7 PG (ref 26.5–33)
MCHC RBC AUTO-ENTMCNC: 35 G/DL (ref 31.5–36.5)
MCV RBC AUTO: 91 FL (ref 78–100)
MONOCYTES # BLD AUTO: 0.5 10E3/UL (ref 0–1.3)
MONOCYTES NFR BLD AUTO: 8 %
NEUTROPHILS # BLD AUTO: 3.9 10E3/UL (ref 1.6–8.3)
NEUTROPHILS NFR BLD AUTO: 61 %
NRBC # BLD AUTO: 0 10E3/UL
NRBC BLD AUTO-RTO: 0 /100
PLATELET # BLD AUTO: 162 10E3/UL (ref 150–450)
POTASSIUM SERPL-SCNC: 4.3 MMOL/L (ref 3.4–5.3)
RBC # BLD AUTO: 3.6 10E6/UL (ref 4.4–5.9)
SODIUM SERPL-SCNC: 141 MMOL/L (ref 135–145)
TROPONIN T SERPL HS-MCNC: 13 NG/L
TROPONIN T SERPL HS-MCNC: 13 NG/L
WBC # BLD AUTO: 6.3 10E3/UL (ref 4–11)

## 2025-07-25 PROCEDURE — 250N000011 HC RX IP 250 OP 636: Performed by: STUDENT IN AN ORGANIZED HEALTH CARE EDUCATION/TRAINING PROGRAM

## 2025-07-25 PROCEDURE — 99284 EMERGENCY DEPT VISIT MOD MDM: CPT | Performed by: STUDENT IN AN ORGANIZED HEALTH CARE EDUCATION/TRAINING PROGRAM

## 2025-07-25 PROCEDURE — 70450 CT HEAD/BRAIN W/O DYE: CPT | Mod: 26 | Performed by: RADIOLOGY

## 2025-07-25 PROCEDURE — 250N000009 HC RX 250: Performed by: STUDENT IN AN ORGANIZED HEALTH CARE EDUCATION/TRAINING PROGRAM

## 2025-07-25 PROCEDURE — 85025 COMPLETE CBC W/AUTO DIFF WBC: CPT | Performed by: STUDENT IN AN ORGANIZED HEALTH CARE EDUCATION/TRAINING PROGRAM

## 2025-07-25 PROCEDURE — 36415 COLL VENOUS BLD VENIPUNCTURE: CPT | Performed by: STUDENT IN AN ORGANIZED HEALTH CARE EDUCATION/TRAINING PROGRAM

## 2025-07-25 PROCEDURE — 99285 EMERGENCY DEPT VISIT HI MDM: CPT | Mod: 25 | Performed by: STUDENT IN AN ORGANIZED HEALTH CARE EDUCATION/TRAINING PROGRAM

## 2025-07-25 PROCEDURE — 70450 CT HEAD/BRAIN W/O DYE: CPT

## 2025-07-25 PROCEDURE — 71046 X-RAY EXAM CHEST 2 VIEWS: CPT

## 2025-07-25 PROCEDURE — 83735 ASSAY OF MAGNESIUM: CPT | Performed by: STUDENT IN AN ORGANIZED HEALTH CARE EDUCATION/TRAINING PROGRAM

## 2025-07-25 PROCEDURE — 82310 ASSAY OF CALCIUM: CPT | Performed by: STUDENT IN AN ORGANIZED HEALTH CARE EDUCATION/TRAINING PROGRAM

## 2025-07-25 PROCEDURE — 93010 ELECTROCARDIOGRAM REPORT: CPT | Performed by: INTERNAL MEDICINE

## 2025-07-25 PROCEDURE — 96365 THER/PROPH/DIAG IV INF INIT: CPT

## 2025-07-25 PROCEDURE — 250N000013 HC RX MED GY IP 250 OP 250 PS 637: Performed by: STUDENT IN AN ORGANIZED HEALTH CARE EDUCATION/TRAINING PROGRAM

## 2025-07-25 PROCEDURE — 93005 ELECTROCARDIOGRAM TRACING: CPT

## 2025-07-25 PROCEDURE — 80048 BASIC METABOLIC PNL TOTAL CA: CPT | Performed by: STUDENT IN AN ORGANIZED HEALTH CARE EDUCATION/TRAINING PROGRAM

## 2025-07-25 PROCEDURE — 71046 X-RAY EXAM CHEST 2 VIEWS: CPT | Mod: 26 | Performed by: RADIOLOGY

## 2025-07-25 PROCEDURE — 84484 ASSAY OF TROPONIN QUANT: CPT | Performed by: STUDENT IN AN ORGANIZED HEALTH CARE EDUCATION/TRAINING PROGRAM

## 2025-07-25 RX ORDER — LIDOCAINE HYDROCHLORIDE 20 MG/ML
10 SOLUTION OROPHARYNGEAL ONCE
Status: COMPLETED | OUTPATIENT
Start: 2025-07-25 | End: 2025-07-25

## 2025-07-25 RX ORDER — MAGNESIUM SULFATE HEPTAHYDRATE 40 MG/ML
2 INJECTION, SOLUTION INTRAVENOUS ONCE
Status: COMPLETED | OUTPATIENT
Start: 2025-07-25 | End: 2025-07-25

## 2025-07-25 RX ORDER — MAGNESIUM HYDROXIDE/ALUMINUM HYDROXICE/SIMETHICONE 120; 1200; 1200 MG/30ML; MG/30ML; MG/30ML
15 SUSPENSION ORAL ONCE
Status: COMPLETED | OUTPATIENT
Start: 2025-07-25 | End: 2025-07-25

## 2025-07-25 RX ORDER — NITROGLYCERIN 0.4 MG/1
0.4 TABLET SUBLINGUAL EVERY 5 MIN PRN
Status: DISCONTINUED | OUTPATIENT
Start: 2025-07-25 | End: 2025-07-26 | Stop reason: HOSPADM

## 2025-07-25 RX ADMIN — MAGNESIUM SULFATE HEPTAHYDRATE 2 G: 40 INJECTION, SOLUTION INTRAVENOUS at 22:33

## 2025-07-25 RX ADMIN — LIDOCAINE HYDROCHLORIDE 10 ML: 20 SOLUTION ORAL at 22:34

## 2025-07-25 RX ADMIN — ALUMINUM HYDROXIDE, MAGNESIUM HYDROXIDE, AND SIMETHICONE 15 ML: 200; 200; 20 SUSPENSION ORAL at 22:34

## 2025-07-25 ASSESSMENT — COLUMBIA-SUICIDE SEVERITY RATING SCALE - C-SSRS
2. HAVE YOU ACTUALLY HAD ANY THOUGHTS OF KILLING YOURSELF IN THE PAST MONTH?: NO
1. IN THE PAST MONTH, HAVE YOU WISHED YOU WERE DEAD OR WISHED YOU COULD GO TO SLEEP AND NOT WAKE UP?: NO
6. HAVE YOU EVER DONE ANYTHING, STARTED TO DO ANYTHING, OR PREPARED TO DO ANYTHING TO END YOUR LIFE?: NO

## 2025-07-25 ASSESSMENT — ACTIVITIES OF DAILY LIVING (ADL)
ADLS_ACUITY_SCORE: 41
ADLS_ACUITY_SCORE: 41

## 2025-07-26 VITALS
TEMPERATURE: 97.9 F | HEART RATE: 70 BPM | DIASTOLIC BLOOD PRESSURE: 75 MMHG | OXYGEN SATURATION: 95 % | RESPIRATION RATE: 12 BRPM | SYSTOLIC BLOOD PRESSURE: 162 MMHG

## 2025-07-26 LAB
ATRIAL RATE - MUSE: 76 BPM
DIASTOLIC BLOOD PRESSURE - MUSE: NORMAL MMHG
INTERPRETATION ECG - MUSE: NORMAL
P AXIS - MUSE: 48 DEGREES
PR INTERVAL - MUSE: 194 MS
QRS DURATION - MUSE: 82 MS
QT - MUSE: 348 MS
QTC - MUSE: 391 MS
R AXIS - MUSE: -7 DEGREES
SYSTOLIC BLOOD PRESSURE - MUSE: NORMAL MMHG
T AXIS - MUSE: 26 DEGREES
VENTRICULAR RATE- MUSE: 76 BPM

## 2025-07-26 ASSESSMENT — ACTIVITIES OF DAILY LIVING (ADL)
ADLS_ACUITY_SCORE: 41
ADLS_ACUITY_SCORE: 41

## 2025-07-26 NOTE — DISCHARGE INSTRUCTIONS
Return to the emergency department for worsening symptoms or new concerning symptoms.  Follow-up with your primary care provider in the next week.  Call schedule appointment.  Get your Zio patch placed as soon as possible and review the recording with your primary care provider when you see them.

## 2025-07-26 NOTE — ED TRIAGE NOTES
"Patient presents with c/o syncopal episode that occurred about 2040. Reports that he stood up to go to the bathroom and \"went black\" reports waking up mid fall, tried grabbing on to things without success and falling on left side, with laceration to the back of head. Also reports chest pain. No chest pain prior to fall.         "

## 2025-07-26 NOTE — ED PROVIDER NOTES
[facility]  ED Provider Note    Chief Complaint   Patient presents with    Fall     History:  Joon Hernandez Sr is a 75 year old male with past medical history of diabetes, ischemic stroke, hyperlipidemia, hypertension presents to the emergency department today complaining of a syncopal event.  He was getting over go to the bathroom, then without warning he passed out.  Afterwards he woke up and he felt what felt like heartburn in his chest.  That persists now.  No abdominal pain.  No nausea vomiting diarrhea no hematemesis no history of blood clots not coughing up blood not on chemo or therapy.  No ripping tearing in the chest going to the back.  No headache.  No neck pain.  No hematochezia.  No other complaints at this time    Review of Systems   Performed; see HPI for pertinent positives and negatives.     Medical history, surgical history, and social history was reviewed.  Nursing documentation, triage note, and vitals were reviewed.    Vitals:  BP: (!) 195/95  Pulse: 72  Temp: 97.9  F (36.6  C)  Resp: 18  SpO2: 96 %    Physical Exam:  Constitutional: Alert and conversant. NAD   HENT: NCAT   Eyes: Normal pupils   Neck: supple   CV: No pallor, RRR  Pulmonary/Chest: Non-labored respirations, CTAB  Abdominal: non-distended soft, nontender no rebound no guarding  MSK: OROZCO.   Neuro: Alert and appropriate   Skin: Warm and dry. No diaphoresis. No rashes on exposed skin    Psych: Appropriate mood and affect       MDM:      ED Course as of 07/26/25 0010   Sat Jul 26, 2025   0008 Joon Hernandez Sr is a 75 year old male presenting with transient loss of consciousness.   Vitals HTN otherwise reassuring  Exam reassuring asymptomatic at this time, no focal neurological deficit  Differential includes but is not limited to syncope, hypoglycemia, seizure, SAH/ICH, TIA, MI/ACS, arrythmia (WPW, brugada, Long QT, HOCM), aortic dissection, ruptured AAA, aortic stenosis, toxins/drugs, infection/sepsis, PE, GI  bleed/hypovolemia, and orthostatic hypotension. No clinical history consistent with seizure.     Arrhythmia is unlikely.  EKG shows NSR, no interval abnormalities (eg QT prolongation/WPW).  No findings to suggest Brugada.  Tele reveals no tachycardia or bradycardic dysrhythmia.  HOCM was considered but there are no clear historical elements pointing toward this.  EKG not suggestive.  The QRS voltage is not extremely large and no suggestive Q waves.     , not low. Patient does complain of headache after head strike so CT ordered and no SAH. Neuro exam unremarkable, low suspicion for acute intracranial bleed or ischemia. EKG with normal intervals and no evidence of ischemia. No tearing pain to back and no chest/abdominal pain so dissection less likely. Hemodynamically stable so less consistent with ruptured AAA. Aortic stenosis possible but less likely given lack of AS hx, no murmur appreciated. PE less likely as patient does not have pleuritic pain, SOB, or ongoing symptoms to suggest massive/submassive PE capable of causing cerebral hypoperfusion and syncope and is low risk    Labs largely reassuring.  Magnesium slightly low at 1.5 and repleted complete resolution of symptoms after GI cocktail. Imaging reassuring chest x-ray and head CT on my independent read. No evidence of sepsis. Most likely etiology of syncope is unclear.     Patient is appropriate for further outpatient management. Discharged in stable condition with all questions answered and return precautions given. Follow up with PCP.  Zio patch ordered.         Procedures:  Procedures        Impression:  Final diagnoses:   Syncope, unspecified syncope type            Rui Newton MD  07/26/25 0010

## (undated) DEVICE — TUBING-SUCTION 20FT

## (undated) DEVICE — CONNECTOR-ERBEFLO 2 PORT

## (undated) DEVICE — FORCEP-COLON BIOPSY LARGE W/NEEDLE 240CM

## (undated) DEVICE — IRRIGATION-H2O 1000ML